# Patient Record
Sex: FEMALE | Race: WHITE | NOT HISPANIC OR LATINO | URBAN - METROPOLITAN AREA
[De-identification: names, ages, dates, MRNs, and addresses within clinical notes are randomized per-mention and may not be internally consistent; named-entity substitution may affect disease eponyms.]

---
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
## 2019-01-06 ENCOUNTER — APPOINTMENT (OUTPATIENT)
Dept: CT IMAGING | Facility: HOSPITAL | Age: 19
End: 2019-01-06

## 2019-01-06 ENCOUNTER — HOSPITAL ENCOUNTER (EMERGENCY)
Facility: HOSPITAL | Age: 19
Discharge: HOME | End: 2019-01-06
Attending: EMERGENCY MEDICINE | Admitting: EMERGENCY MEDICINE

## 2019-01-06 VITALS
DIASTOLIC BLOOD PRESSURE: 74 MMHG | SYSTOLIC BLOOD PRESSURE: 119 MMHG | HEART RATE: 91 BPM | TEMPERATURE: 98.6 F | OXYGEN SATURATION: 98 % | HEIGHT: 62 IN | RESPIRATION RATE: 17 BRPM | BODY MASS INDEX: 22.08 KG/M2 | WEIGHT: 120 LBS

## 2019-01-06 DIAGNOSIS — R10.31 RLQ ABDOMINAL PAIN: Primary | ICD-10-CM

## 2019-01-06 DIAGNOSIS — N39.0 URINARY TRACT INFECTION WITHOUT HEMATURIA, SITE UNSPECIFIED: ICD-10-CM

## 2019-01-06 LAB
ALBUMIN SERPL-MCNC: 4.9 G/DL (ref 3.2–4.8)
ALBUMIN/GLOB SERPL: 1.7 G/DL (ref 1.5–2.5)
ALP SERPL-CCNC: 84 U/L (ref 25–100)
ALT SERPL W P-5'-P-CCNC: 20 U/L (ref 7–40)
ANION GAP SERPL CALCULATED.3IONS-SCNC: 8 MMOL/L (ref 3–11)
AST SERPL-CCNC: 24 U/L (ref 0–33)
B-HCG UR QL: NEGATIVE
BACTERIA UR QL AUTO: ABNORMAL /HPF
BASOPHILS # BLD AUTO: 0.03 10*3/MM3 (ref 0–0.2)
BASOPHILS NFR BLD AUTO: 0.3 % (ref 0–1)
BILIRUB SERPL-MCNC: 0.4 MG/DL (ref 0.3–1.2)
BILIRUB UR QL STRIP: NEGATIVE
BUN BLD-MCNC: 11 MG/DL (ref 9–23)
BUN/CREAT SERPL: 17.2 (ref 7–25)
CALCIUM SPEC-SCNC: 9.5 MG/DL (ref 8.7–10.4)
CHLORIDE SERPL-SCNC: 104 MMOL/L (ref 99–109)
CLARITY UR: ABNORMAL
CO2 SERPL-SCNC: 29 MMOL/L (ref 20–31)
COLOR UR: YELLOW
CREAT BLD-MCNC: 0.64 MG/DL (ref 0.6–1.3)
DEPRECATED RDW RBC AUTO: 44.3 FL (ref 37–54)
EOSINOPHIL # BLD AUTO: 0.03 10*3/MM3 (ref 0–0.3)
EOSINOPHIL NFR BLD AUTO: 0.3 % (ref 0–3)
ERYTHROCYTE [DISTWIDTH] IN BLOOD BY AUTOMATED COUNT: 13.6 % (ref 11.3–14.5)
GFR SERPL CREATININE-BSD FRML MDRD: 120 ML/MIN/1.73
GLOBULIN UR ELPH-MCNC: 2.9 GM/DL
GLUCOSE BLD-MCNC: 91 MG/DL (ref 70–100)
GLUCOSE UR STRIP-MCNC: NEGATIVE MG/DL
HCT VFR BLD AUTO: 45.7 % (ref 34.5–44)
HGB BLD-MCNC: 14.7 G/DL (ref 11.5–15.5)
HGB UR QL STRIP.AUTO: NEGATIVE
HOLD SPECIMEN: NORMAL
HOLD SPECIMEN: NORMAL
HYALINE CASTS UR QL AUTO: ABNORMAL /LPF
IMM GRANULOCYTES # BLD AUTO: 0.01 10*3/MM3 (ref 0–0.03)
IMM GRANULOCYTES NFR BLD AUTO: 0.1 % (ref 0–0.6)
INTERNAL NEGATIVE CONTROL: NEGATIVE
INTERNAL POSITIVE CONTROL: POSITIVE
KETONES UR QL STRIP: NEGATIVE
LEUKOCYTE ESTERASE UR QL STRIP.AUTO: ABNORMAL
LIPASE SERPL-CCNC: 32 U/L (ref 6–51)
LYMPHOCYTES # BLD AUTO: 2.46 10*3/MM3 (ref 0.6–4.8)
LYMPHOCYTES NFR BLD AUTO: 26.1 % (ref 24–44)
Lab: NORMAL
MCH RBC QN AUTO: 28.7 PG (ref 27–31)
MCHC RBC AUTO-ENTMCNC: 32.2 G/DL (ref 32–36)
MCV RBC AUTO: 89.3 FL (ref 80–99)
MONOCYTES # BLD AUTO: 0.78 10*3/MM3 (ref 0–1)
MONOCYTES NFR BLD AUTO: 8.3 % (ref 0–12)
NEUTROPHILS # BLD AUTO: 6.14 10*3/MM3 (ref 1.5–8.3)
NEUTROPHILS NFR BLD AUTO: 65 % (ref 41–71)
NITRITE UR QL STRIP: POSITIVE
PH UR STRIP.AUTO: >=9 [PH] (ref 5–8)
PLATELET # BLD AUTO: 298 10*3/MM3 (ref 150–450)
PMV BLD AUTO: 10.3 FL (ref 6–12)
POTASSIUM BLD-SCNC: 4.4 MMOL/L (ref 3.5–5.5)
PROT SERPL-MCNC: 7.8 G/DL (ref 5.7–8.2)
PROT UR QL STRIP: ABNORMAL
RBC # BLD AUTO: 5.12 10*6/MM3 (ref 3.89–5.14)
RBC # UR: ABNORMAL /HPF
REF LAB TEST METHOD: ABNORMAL
SODIUM BLD-SCNC: 141 MMOL/L (ref 132–146)
SP GR UR STRIP: 1.02 (ref 1–1.03)
SQUAMOUS #/AREA URNS HPF: ABNORMAL /HPF
UROBILINOGEN UR QL STRIP: ABNORMAL
WBC NRBC COR # BLD: 9.44 10*3/MM3 (ref 4.5–13.5)
WBC UR QL AUTO: ABNORMAL /HPF
WHOLE BLOOD HOLD SPECIMEN: NORMAL
WHOLE BLOOD HOLD SPECIMEN: NORMAL

## 2019-01-06 PROCEDURE — 25010000002 CEFTRIAXONE PER 250 MG: Performed by: PHYSICIAN ASSISTANT

## 2019-01-06 PROCEDURE — 99284 EMERGENCY DEPT VISIT MOD MDM: CPT

## 2019-01-06 PROCEDURE — 83690 ASSAY OF LIPASE: CPT | Performed by: EMERGENCY MEDICINE

## 2019-01-06 PROCEDURE — 81025 URINE PREGNANCY TEST: CPT | Performed by: EMERGENCY MEDICINE

## 2019-01-06 PROCEDURE — 80053 COMPREHEN METABOLIC PANEL: CPT | Performed by: EMERGENCY MEDICINE

## 2019-01-06 PROCEDURE — 87077 CULTURE AEROBIC IDENTIFY: CPT | Performed by: EMERGENCY MEDICINE

## 2019-01-06 PROCEDURE — 85025 COMPLETE CBC W/AUTO DIFF WBC: CPT | Performed by: EMERGENCY MEDICINE

## 2019-01-06 PROCEDURE — 87186 SC STD MICRODIL/AGAR DIL: CPT | Performed by: EMERGENCY MEDICINE

## 2019-01-06 PROCEDURE — 96365 THER/PROPH/DIAG IV INF INIT: CPT

## 2019-01-06 PROCEDURE — 25010000002 IOPAMIDOL 61 % SOLUTION: Performed by: EMERGENCY MEDICINE

## 2019-01-06 PROCEDURE — 81001 URINALYSIS AUTO W/SCOPE: CPT | Performed by: EMERGENCY MEDICINE

## 2019-01-06 PROCEDURE — 96375 TX/PRO/DX INJ NEW DRUG ADDON: CPT

## 2019-01-06 PROCEDURE — 25010000002 ONDANSETRON PER 1 MG: Performed by: PHYSICIAN ASSISTANT

## 2019-01-06 PROCEDURE — 74177 CT ABD & PELVIS W/CONTRAST: CPT

## 2019-01-06 PROCEDURE — 87086 URINE CULTURE/COLONY COUNT: CPT | Performed by: EMERGENCY MEDICINE

## 2019-01-06 RX ORDER — CEFDINIR 300 MG/1
300 CAPSULE ORAL 2 TIMES DAILY
Qty: 20 CAPSULE | Refills: 0 | OUTPATIENT
Start: 2019-01-06 | End: 2019-07-19

## 2019-01-06 RX ORDER — CEFTRIAXONE SODIUM 1 G/50ML
1 INJECTION, SOLUTION INTRAVENOUS ONCE
Status: COMPLETED | OUTPATIENT
Start: 2019-01-06 | End: 2019-01-06

## 2019-01-06 RX ORDER — ONDANSETRON 4 MG/1
4 TABLET, ORALLY DISINTEGRATING ORAL EVERY 6 HOURS PRN
Qty: 15 TABLET | Refills: 0 | OUTPATIENT
Start: 2019-01-06 | End: 2019-07-19 | Stop reason: HOSPADM

## 2019-01-06 RX ORDER — SODIUM CHLORIDE 0.9 % (FLUSH) 0.9 %
10 SYRINGE (ML) INJECTION AS NEEDED
Status: DISCONTINUED | OUTPATIENT
Start: 2019-01-06 | End: 2019-01-06 | Stop reason: HOSPADM

## 2019-01-06 RX ORDER — ONDANSETRON 2 MG/ML
4 INJECTION INTRAMUSCULAR; INTRAVENOUS ONCE
Status: COMPLETED | OUTPATIENT
Start: 2019-01-06 | End: 2019-01-06

## 2019-01-06 RX ADMIN — ONDANSETRON 4 MG: 2 INJECTION INTRAMUSCULAR; INTRAVENOUS at 14:45

## 2019-01-06 RX ADMIN — IOPAMIDOL 85 ML: 612 INJECTION, SOLUTION INTRAVENOUS at 17:27

## 2019-01-06 RX ADMIN — SODIUM CHLORIDE 1000 ML: 9 INJECTION, SOLUTION INTRAVENOUS at 14:45

## 2019-01-06 RX ADMIN — CEFTRIAXONE SODIUM 1 G: 1 INJECTION, SOLUTION INTRAVENOUS at 15:59

## 2019-01-06 NOTE — ED PROVIDER NOTES
Subjective   18-year-old female presents to the emergency department with nausea and vomiting.  The symptoms began about 10:00 this morning.  She states that she has vomited at least 10 times.  No diarrhea.  She complains of some mild suprapubic abdominal pain secondary to the vomiting.  No fever or chills.  Normal urination.  Her last menstrual period was 3 weeks ago.  No known health issues.  She is a nonsmoker.  No alcohol or drug use.  Her PCP is Vishnu Francisco.            Review of Systems   Constitutional: Negative for chills and fever.   HENT: Negative for sore throat.    Eyes: Negative for visual disturbance.   Respiratory: Negative for cough and shortness of breath.    Cardiovascular: Negative for chest pain.   Gastrointestinal: Positive for abdominal pain (mild suprapubic), nausea and vomiting. Negative for blood in stool and diarrhea.   Endocrine: Negative for polydipsia, polyphagia and polyuria.   Genitourinary: Negative for decreased urine volume and dysuria.   Musculoskeletal: Positive for back pain (mild low back pain). Negative for neck pain.   Skin: Negative for rash.   Allergic/Immunologic: Negative for immunocompromised state.   Neurological: Negative for dizziness, weakness, light-headedness and headaches.   Hematological: Negative for adenopathy.   Psychiatric/Behavioral: Negative.        Past Medical History:   Diagnosis Date   • Migraine        No Known Allergies    Past Surgical History:   Procedure Laterality Date   • ADENOIDECTOMY     • EAR TUBES Bilateral    • INNER EAR SURGERY Right        Family History   Problem Relation Age of Onset   • Alcohol abuse Father    • Hypertension Maternal Grandmother    • Cancer Maternal Grandfather    • Parkinsonism Paternal Grandfather        Social History     Socioeconomic History   • Marital status: Single     Spouse name: Not on file   • Number of children: Not on file   • Years of education: Not on file   • Highest education level: Not on file    Occupational History   • Occupation: student   Tobacco Use   • Smoking status: Never Smoker   • Smokeless tobacco: Never Used   Substance and Sexual Activity   • Alcohol use: No   • Drug use: No   • Sexual activity: Defer     Birth control/protection: Injection   Social History Narrative    Mother and grandmother with pt today           Objective   Physical Exam   Constitutional: She appears well-developed and well-nourished.   HENT:   Head: Normocephalic.   Nose: Nose normal.   Mouth/Throat: Oropharynx is clear and moist.   Eyes: Conjunctivae are normal. Pupils are equal, round, and reactive to light. No scleral icterus.   Neck: Normal range of motion. Neck supple.   Cardiovascular: Normal rate, regular rhythm, normal heart sounds and intact distal pulses.   Pulmonary/Chest: Effort normal and breath sounds normal. No respiratory distress.   Abdominal: Soft. Bowel sounds are normal. She exhibits no mass. There is tenderness (mild suprpubic tenderness). There is no guarding.   Musculoskeletal: Normal range of motion. She exhibits no edema or tenderness.   Neurological: She is alert.   Skin: Skin is warm and dry.   Psychiatric: She has a normal mood and affect.       Procedures           ED Course      CT of the abdomen and pelvis is negative.   Urinalysis shows 4+ bacteria, 3-5 white cells and 7-12 epithelial cells.  White count is normal at 9.44.  I will send her urine for culture and treat her for UTI.  She was given Rocephin in the ER.  I will discharge her on Omnicef and Zofran.  She has been advised return to the emergency department if she has worsening symptoms.            MDM      Final diagnoses:   RLQ abdominal pain   Urinary tract infection without hematuria, site unspecified            Chay Balbuena, PA  01/06/19 3384

## 2019-01-07 NOTE — DISCHARGE INSTRUCTIONS
Rest.  Zofran and Omnicef as prescribed.  Follow up with your PCP at next available appointment.  Return to ER for recheck tomorrow if symptoms persist or worsen.

## 2019-01-08 LAB — BACTERIA SPEC AEROBE CULT: ABNORMAL

## 2019-07-19 ENCOUNTER — APPOINTMENT (OUTPATIENT)
Dept: GENERAL RADIOLOGY | Facility: HOSPITAL | Age: 19
End: 2019-07-19

## 2019-07-19 ENCOUNTER — HOSPITAL ENCOUNTER (EMERGENCY)
Facility: HOSPITAL | Age: 19
Discharge: HOME | End: 2019-07-19
Attending: EMERGENCY MEDICINE | Admitting: EMERGENCY MEDICINE

## 2019-07-19 VITALS
HEIGHT: 62 IN | WEIGHT: 120 LBS | OXYGEN SATURATION: 100 % | HEART RATE: 87 BPM | RESPIRATION RATE: 16 BRPM | SYSTOLIC BLOOD PRESSURE: 125 MMHG | DIASTOLIC BLOOD PRESSURE: 74 MMHG | TEMPERATURE: 98.4 F | BODY MASS INDEX: 22.08 KG/M2

## 2019-07-19 DIAGNOSIS — N39.0 URINARY TRACT INFECTION IN FEMALE: ICD-10-CM

## 2019-07-19 DIAGNOSIS — J40 BRONCHITIS: Primary | ICD-10-CM

## 2019-07-19 LAB
ALBUMIN SERPL-MCNC: 4.3 G/DL (ref 3.5–5.2)
ALBUMIN/GLOB SERPL: 1.4 G/DL
ALP SERPL-CCNC: 77 U/L (ref 39–117)
ALT SERPL W P-5'-P-CCNC: 12 U/L (ref 1–33)
ANION GAP SERPL CALCULATED.3IONS-SCNC: 10 MMOL/L (ref 5–15)
AST SERPL-CCNC: 20 U/L (ref 1–32)
B-HCG UR QL: NEGATIVE
BACTERIA UR QL AUTO: ABNORMAL /HPF
BASOPHILS # BLD AUTO: 0.03 10*3/MM3 (ref 0–0.2)
BASOPHILS NFR BLD AUTO: 0.5 % (ref 0–1.5)
BILIRUB SERPL-MCNC: 0.5 MG/DL (ref 0.2–1.2)
BILIRUB UR QL STRIP: NEGATIVE
BUN BLD-MCNC: 8 MG/DL (ref 6–20)
BUN/CREAT SERPL: 11.1 (ref 7–25)
CALCIUM SPEC-SCNC: 9.1 MG/DL (ref 8.6–10.5)
CHLORIDE SERPL-SCNC: 103 MMOL/L (ref 98–107)
CLARITY UR: ABNORMAL
CO2 SERPL-SCNC: 25 MMOL/L (ref 22–29)
COLOR UR: YELLOW
CREAT BLD-MCNC: 0.72 MG/DL (ref 0.57–1)
D DIMER PPP FEU-MCNC: <0.27 MCGFEU/ML (ref 0–0.56)
DEPRECATED RDW RBC AUTO: 40.5 FL (ref 37–54)
EOSINOPHIL # BLD AUTO: 0.06 10*3/MM3 (ref 0–0.4)
EOSINOPHIL NFR BLD AUTO: 0.9 % (ref 0.3–6.2)
ERYTHROCYTE [DISTWIDTH] IN BLOOD BY AUTOMATED COUNT: 12.3 % (ref 12.3–15.4)
GFR SERPL CREATININE-BSD FRML MDRD: 104 ML/MIN/1.73
GLOBULIN UR ELPH-MCNC: 3 GM/DL
GLUCOSE BLD-MCNC: 90 MG/DL (ref 65–99)
GLUCOSE UR STRIP-MCNC: NEGATIVE MG/DL
HCT VFR BLD AUTO: 43 % (ref 34–46.6)
HGB BLD-MCNC: 13.9 G/DL (ref 12–15.9)
HGB UR QL STRIP.AUTO: NEGATIVE
HYALINE CASTS UR QL AUTO: ABNORMAL /LPF
IMM GRANULOCYTES # BLD AUTO: 0.01 10*3/MM3 (ref 0–0.05)
IMM GRANULOCYTES NFR BLD AUTO: 0.2 % (ref 0–0.5)
INTERNAL NEGATIVE CONTROL: NEGATIVE
INTERNAL POSITIVE CONTROL: POSITIVE
KETONES UR QL STRIP: ABNORMAL
LEUKOCYTE ESTERASE UR QL STRIP.AUTO: ABNORMAL
LYMPHOCYTES # BLD AUTO: 3.33 10*3/MM3 (ref 0.7–3.1)
LYMPHOCYTES NFR BLD AUTO: 51.5 % (ref 19.6–45.3)
Lab: NORMAL
MCH RBC QN AUTO: 29.3 PG (ref 26.6–33)
MCHC RBC AUTO-ENTMCNC: 32.3 G/DL (ref 31.5–35.7)
MCV RBC AUTO: 90.7 FL (ref 79–97)
MONOCYTES # BLD AUTO: 0.65 10*3/MM3 (ref 0.1–0.9)
MONOCYTES NFR BLD AUTO: 10 % (ref 5–12)
NEUTROPHILS # BLD AUTO: 2.39 10*3/MM3 (ref 1.7–7)
NEUTROPHILS NFR BLD AUTO: 36.9 % (ref 42.7–76)
NITRITE UR QL STRIP: NEGATIVE
NRBC BLD AUTO-RTO: 0 /100 WBC (ref 0–0.2)
PH UR STRIP.AUTO: 7 [PH] (ref 5–8)
PLATELET # BLD AUTO: 240 10*3/MM3 (ref 140–450)
PMV BLD AUTO: 10.5 FL (ref 6–12)
POTASSIUM BLD-SCNC: 4 MMOL/L (ref 3.5–5.2)
PROT SERPL-MCNC: 7.3 G/DL (ref 6–8.5)
PROT UR QL STRIP: ABNORMAL
RBC # BLD AUTO: 4.74 10*6/MM3 (ref 3.77–5.28)
RBC # UR: ABNORMAL /HPF
REF LAB TEST METHOD: ABNORMAL
SODIUM BLD-SCNC: 138 MMOL/L (ref 136–145)
SP GR UR STRIP: 1.02 (ref 1–1.03)
SQUAMOUS #/AREA URNS HPF: ABNORMAL /HPF
UROBILINOGEN UR QL STRIP: ABNORMAL
WBC NRBC COR # BLD: 6.47 10*3/MM3 (ref 3.4–10.8)
WBC UR QL AUTO: ABNORMAL /HPF

## 2019-07-19 PROCEDURE — 71046 X-RAY EXAM CHEST 2 VIEWS: CPT

## 2019-07-19 PROCEDURE — 80053 COMPREHEN METABOLIC PANEL: CPT | Performed by: NURSE PRACTITIONER

## 2019-07-19 PROCEDURE — 81025 URINE PREGNANCY TEST: CPT | Performed by: NURSE PRACTITIONER

## 2019-07-19 PROCEDURE — 94640 AIRWAY INHALATION TREATMENT: CPT

## 2019-07-19 PROCEDURE — 85025 COMPLETE CBC W/AUTO DIFF WBC: CPT | Performed by: NURSE PRACTITIONER

## 2019-07-19 PROCEDURE — 85379 FIBRIN DEGRADATION QUANT: CPT | Performed by: NURSE PRACTITIONER

## 2019-07-19 PROCEDURE — 99284 EMERGENCY DEPT VISIT MOD MDM: CPT

## 2019-07-19 PROCEDURE — 81001 URINALYSIS AUTO W/SCOPE: CPT | Performed by: NURSE PRACTITIONER

## 2019-07-19 RX ORDER — BENZONATATE 100 MG/1
100 CAPSULE ORAL 3 TIMES DAILY PRN
Qty: 21 CAPSULE | Refills: 0 | Status: SHIPPED | OUTPATIENT
Start: 2019-07-19 | End: 2019-09-26

## 2019-07-19 RX ORDER — CETIRIZINE HYDROCHLORIDE 10 MG/1
10 TABLET ORAL DAILY
Qty: 30 TABLET | Refills: 0 | Status: SHIPPED | OUTPATIENT
Start: 2019-07-19 | End: 2019-08-09

## 2019-07-19 RX ORDER — SODIUM CHLORIDE 0.9 % (FLUSH) 0.9 %
10 SYRINGE (ML) INJECTION AS NEEDED
Status: DISCONTINUED | OUTPATIENT
Start: 2019-07-19 | End: 2019-07-19 | Stop reason: HOSPADM

## 2019-07-19 RX ORDER — ALBUTEROL SULFATE 90 UG/1
2 AEROSOL, METERED RESPIRATORY (INHALATION) EVERY 6 HOURS PRN
Qty: 8 G | Refills: 0 | Status: SHIPPED | OUTPATIENT
Start: 2019-07-19 | End: 2020-08-21

## 2019-07-19 RX ORDER — IPRATROPIUM BROMIDE AND ALBUTEROL SULFATE 2.5; .5 MG/3ML; MG/3ML
3 SOLUTION RESPIRATORY (INHALATION) ONCE
Status: COMPLETED | OUTPATIENT
Start: 2019-07-19 | End: 2019-07-19

## 2019-07-19 RX ORDER — CEFDINIR 300 MG/1
300 CAPSULE ORAL 2 TIMES DAILY
Qty: 20 CAPSULE | Refills: 0 | Status: SHIPPED | OUTPATIENT
Start: 2019-07-19 | End: 2019-08-09

## 2019-07-19 RX ORDER — METHYLPREDNISOLONE 4 MG/1
TABLET ORAL
Qty: 21 TABLET | Refills: 0 | Status: SHIPPED | OUTPATIENT
Start: 2019-07-19 | End: 2019-08-09

## 2019-07-19 RX ADMIN — IPRATROPIUM BROMIDE AND ALBUTEROL SULFATE 3 ML: 2.5; .5 SOLUTION RESPIRATORY (INHALATION) at 11:45

## 2019-08-09 ENCOUNTER — OFFICE VISIT (OUTPATIENT)
Dept: INTERNAL MEDICINE | Facility: CLINIC | Age: 19
End: 2019-08-09

## 2019-08-09 VITALS
HEART RATE: 71 BPM | TEMPERATURE: 97.3 F | BODY MASS INDEX: 21.74 KG/M2 | SYSTOLIC BLOOD PRESSURE: 116 MMHG | HEIGHT: 62 IN | DIASTOLIC BLOOD PRESSURE: 70 MMHG | OXYGEN SATURATION: 98 % | WEIGHT: 118.13 LBS | RESPIRATION RATE: 18 BRPM

## 2019-08-09 DIAGNOSIS — R05.9 COUGHING: ICD-10-CM

## 2019-08-09 DIAGNOSIS — J30.9 ALLERGIC RHINITIS, UNSPECIFIED SEASONALITY, UNSPECIFIED TRIGGER: Primary | ICD-10-CM

## 2019-08-09 DIAGNOSIS — Z13.89 ENCOUNTER FOR SCREENING FOR OTHER DISORDER: ICD-10-CM

## 2019-08-09 DIAGNOSIS — Z00.00 ENCOUNTER FOR PREVENTIVE HEALTH EXAMINATION: ICD-10-CM

## 2019-08-09 PROBLEM — R42 DIZZINESS: Status: RESOLVED | Noted: 2018-04-25 | Resolved: 2019-08-09

## 2019-08-09 PROBLEM — R20.2 PARESTHESIA: Status: RESOLVED | Noted: 2018-04-25 | Resolved: 2019-08-09

## 2019-08-09 PROCEDURE — 99395 PREV VISIT EST AGE 18-39: CPT | Performed by: NURSE PRACTITIONER

## 2019-08-09 RX ORDER — ALBUTEROL SULFATE 2.5 MG/3ML
2.5 SOLUTION RESPIRATORY (INHALATION) EVERY 4 HOURS PRN
Qty: 50 VIAL | Refills: 12 | Status: SHIPPED | OUTPATIENT
Start: 2019-08-09 | End: 2020-08-21

## 2019-08-09 RX ORDER — MONTELUKAST SODIUM 10 MG/1
10 TABLET ORAL NIGHTLY
Qty: 30 TABLET | Refills: 3 | Status: SHIPPED | OUTPATIENT
Start: 2019-08-09 | End: 2019-09-26

## 2019-08-09 RX ORDER — CETIRIZINE HYDROCHLORIDE 10 MG/1
10 TABLET ORAL DAILY
Qty: 30 TABLET | Refills: 6 | Status: SHIPPED | OUTPATIENT
Start: 2019-08-09 | End: 2019-09-26

## 2019-08-09 NOTE — PROGRESS NOTES
Chief Complaint   Patient presents with   • Annual Exam     fasting   • Cough     and sneezing       Subjective   Physical    Shortness of Breath   This is a recurrent problem. The current episode started more than 1 month ago. The problem occurs daily. The problem has been gradually worsening. The average episode lasts 2 hours. Associated symptoms include chest pain, coryza, neck pain, orthopnea, PND, rhinorrhea and sputum production. Pertinent negatives include no abdominal pain, claudication, ear pain, fever, hemoptysis, leg pain, leg swelling, rash, sore throat, swollen glands, syncope, vomiting or wheezing. Nothing aggravates the symptoms.      Physical     She had postpartum preeclampsia and elevated blood pressure resolved. No consistent elevation with blood pressure.     Patient complains of cough x 2 months. Cough is productive and she produces copious white secretions. No fever. Associated shortness of breath and chest pain, nasal congestion and intermittent runny nose and sneezing. She has visited urgent care  (6/13/2019) and emergency room 7/19/2019. She has had a negative chest x ray. She has taken azithromycin, prednisone, cefdinir, Zyrtec, Tessalon and albuterol without relief.  She denies any history of allergies or asthma. No nausea, vomiting, chills, body aches, ear pain or discharge. No eye discharge, redness or itching. No recent travel. No known TB risks. She commutes from [place] to work in [place] and stays intermittently at a [employer] apartment. She reports [facility] apartment was positive for mold with airborne test. She is in process of getting out of lease. She is using albuterol as needed with limited relief. She works as a , but states all natural products are used and no exposure to chemicals or fumes.   Oksana Aguirre is a 18 y.o. female.     Are you currently seeing any other doctors or specialists?  None  Are you currently taking any OTC medications or herbal  medications?   None  Sleep: 8 hours  Diet: balanced  Exercise: yes    Most recent colonoscopy: N/A  Most recent mammogram: N/A  Most recent pap smear: current 2019 [facility]  First day of last menses: 8/8/2019    Regular dental visits: current  Regular eye exams: current    The following portions of the patient's history were reviewed and updated as appropriate: allergies, current medications, past family history, past medical history, past social history, past surgical history and problem list.    No Known Allergies  Social History     Tobacco Use   • Smoking status: Never Smoker   • Smokeless tobacco: Never Used   Substance Use Topics   • Alcohol use: No     Past Surgical History:   Procedure Laterality Date   • ADENOIDECTOMY     • EAR TUBES Bilateral    • INNER EAR SURGERY Right      Family History   Problem Relation Age of Onset   • Alcohol abuse Father    • Hypertension Maternal Grandmother    • Anemia Maternal Grandmother    • Cancer Maternal Grandmother    • Cancer Maternal Grandfather    • Parkinsonism Paternal Grandfather          Current Outpatient Medications:   •  albuterol sulfate  (90 Base) MCG/ACT inhaler, Inhale 2 puffs Every 6 (Six) Hours As Needed for Wheezing., Disp: 8 g, Rfl: 0  •  benzonatate (TESSALON) 100 MG capsule, Take 1 capsule by mouth 3 (Three) Times a Day As Needed for Cough., Disp: 21 capsule, Rfl: 0  •  Etonogestrel (NEXPLANON) 68 MG implant subdermal implant, Inject 1 each into the skin 1 (One) Time., Disp: , Rfl:   •  albuterol (PROVENTIL) (2.5 MG/3ML) 0.083% nebulizer solution, Take 2.5 mg by nebulization Every 4 (Four) Hours As Needed for Wheezing., Disp: 50 vial, Rfl: 12  •  cetirizine (zyrTEC) 10 MG tablet, Take 1 tablet by mouth Daily., Disp: 30 tablet, Rfl: 6  •  montelukast (SINGULAIR) 10 MG tablet, Take 1 tablet by mouth Every Night., Disp: 30 tablet, Rfl: 3    Patient Active Problem List   Diagnosis   • White matter abnormality on MRI of brain   • Periodic  headache syndrome, not intractable   • Coughing   • Allergic rhinitis       Review of Systems   Constitutional: Negative for fatigue, fever, unexpected weight gain and unexpected weight loss.   HENT: Positive for congestion, rhinorrhea and sneezing. Negative for ear pain, nosebleeds, sinus pressure, sore throat, tinnitus, trouble swallowing and voice change.    Respiratory: Positive for sputum production and shortness of breath. Negative for hemoptysis and wheezing.    Cardiovascular: Positive for chest pain, orthopnea and PND. Negative for claudication, leg swelling and syncope.   Gastrointestinal: Negative for abdominal pain and vomiting.   Genitourinary:        Recurring UTI-no current symptoms   Musculoskeletal: Positive for neck pain.   Skin: Negative for rash.   Neurological: Negative.    Hematological: Negative.    Psychiatric/Behavioral: Negative.        Objective   Vitals:    08/09/19 0829   BP: 116/70   Pulse: 71   Resp: 18   Temp: 97.3 °F (36.3 °C)   SpO2: 98%     Physical Exam   Constitutional: She is oriented to person, place, and time. She appears well-developed and well-nourished. She is active and cooperative. She is easily aroused.  Non-toxic appearance. She does not have a sickly appearance. She does not appear ill. No distress. She is not overweight.She is not obese.She is not morbidly obese.  HENT:   Head: Normocephalic and atraumatic. Head is without abrasion. Hair is normal.   Right Ear: Hearing, tympanic membrane, external ear and ear canal normal. No foreign bodies. Tympanic membrane is not perforated and not erythematous.   Left Ear: Hearing, tympanic membrane, external ear and ear canal normal. No foreign bodies. Tympanic membrane is not perforated and not erythematous.   Nose: Mucosal edema, rhinorrhea and congestion present. No septal deviation. No epistaxis.  No foreign bodies.   Mouth/Throat: Uvula is midline and oropharynx is clear and moist. No oral lesions. Normal dentition.    Scarring bilateral TM   Eyes: Conjunctivae and lids are normal. Pupils are equal, round, and reactive to light. Right eye exhibits no discharge. Left eye exhibits no discharge. Right conjunctiva is not injected. Left conjunctiva is not injected. No scleral icterus.   Neck: Normal range of motion and full passive range of motion without pain. Neck supple. No edema and normal range of motion present. No thyroid mass and no thyromegaly present.   Cardiovascular: Normal rate, regular rhythm and normal heart sounds. Exam reveals no gallop and no friction rub.   No murmur heard.  Pulmonary/Chest: Effort normal and breath sounds normal. No accessory muscle usage. She has no rhonchi. She has no rales. She exhibits no tenderness.   Abdominal: Soft. Bowel sounds are normal. She exhibits no distension. There is no hepatosplenomegaly or hepatomegaly. There is no tenderness. There is no CVA tenderness.   Musculoskeletal: Normal range of motion. She exhibits no edema, tenderness or deformity.   Lymphadenopathy:     She has no cervical adenopathy.   Neurological: She is alert, oriented to person, place, and time and easily aroused. She has normal reflexes. No cranial nerve deficit. Coordination normal.   Muscle strength 5/5 and equal throughout.   Skin: Skin is warm, dry and intact. No abrasion and no rash noted. She is not diaphoretic. No cyanosis or erythema. Nails show no clubbing.   Psychiatric: She has a normal mood and affect. Her speech is normal and behavior is normal.   Nursing note and vitals reviewed.        Results for orders placed or performed during the hospital encounter of 07/19/19   Comprehensive Metabolic Panel   Result Value Ref Range    Glucose 90 65 - 99 mg/dL    BUN 8 6 - 20 mg/dL    Creatinine 0.72 0.57 - 1.00 mg/dL    Sodium 138 136 - 145 mmol/L    Potassium 4.0 3.5 - 5.2 mmol/L    Chloride 103 98 - 107 mmol/L    CO2 25.0 22.0 - 29.0 mmol/L    Calcium 9.1 8.6 - 10.5 mg/dL    Total Protein 7.3 6.0 - 8.5  g/dL    Albumin 4.30 3.50 - 5.20 g/dL    ALT (SGPT) 12 1 - 33 U/L    AST (SGOT) 20 1 - 32 U/L    Alkaline Phosphatase 77 39 - 117 U/L    Total Bilirubin 0.5 0.2 - 1.2 mg/dL    eGFR Non African Amer 104 >60 mL/min/1.73    Globulin 3.0 gm/dL    A/G Ratio 1.4 g/dL    BUN/Creatinine Ratio 11.1 7.0 - 25.0    Anion Gap 10.0 5.0 - 15.0 mmol/L   D-dimer, Quantitative   Result Value Ref Range    D-Dimer, Quantitative <0.27 0.00 - 0.56 MCGFEU/mL   CBC Auto Differential   Result Value Ref Range    WBC 6.47 3.40 - 10.80 10*3/mm3    RBC 4.74 3.77 - 5.28 10*6/mm3    Hemoglobin 13.9 12.0 - 15.9 g/dL    Hematocrit 43.0 34.0 - 46.6 %    MCV 90.7 79.0 - 97.0 fL    MCH 29.3 26.6 - 33.0 pg    MCHC 32.3 31.5 - 35.7 g/dL    RDW 12.3 12.3 - 15.4 %    RDW-SD 40.5 37.0 - 54.0 fl    MPV 10.5 6.0 - 12.0 fL    Platelets 240 140 - 450 10*3/mm3    Neutrophil % 36.9 (L) 42.7 - 76.0 %    Lymphocyte % 51.5 (H) 19.6 - 45.3 %    Monocyte % 10.0 5.0 - 12.0 %    Eosinophil % 0.9 0.3 - 6.2 %    Basophil % 0.5 0.0 - 1.5 %    Immature Grans % 0.2 0.0 - 0.5 %    Neutrophils, Absolute 2.39 1.70 - 7.00 10*3/mm3    Lymphocytes, Absolute 3.33 (H) 0.70 - 3.10 10*3/mm3    Monocytes, Absolute 0.65 0.10 - 0.90 10*3/mm3    Eosinophils, Absolute 0.06 0.00 - 0.40 10*3/mm3    Basophils, Absolute 0.03 0.00 - 0.20 10*3/mm3    Immature Grans, Absolute 0.01 0.00 - 0.05 10*3/mm3    nRBC 0.0 0.0 - 0.2 /100 WBC   Urinalysis With Microscopic If Indicated (No Culture) - Urine, Clean Catch   Result Value Ref Range    Color, UA Yellow Yellow, Straw    Appearance, UA Cloudy (A) Clear    pH, UA 7.0 5.0 - 8.0    Specific Gravity, UA 1.024 1.001 - 1.030    Glucose, UA Negative Negative    Ketones, UA Trace (A) Negative    Bilirubin, UA Negative Negative    Blood, UA Negative Negative    Protein, UA Trace (A) Negative    Leuk Esterase, UA Large (3+) (A) Negative    Nitrite, UA Negative Negative    Urobilinogen, UA 1.0 E.U./dL 0.2 - 1.0 E.U./dL   Urinalysis, Microscopic Only - Urine,  Clean Catch   Result Value Ref Range    RBC, UA 0-2 None Seen, 0-2 /HPF    WBC, UA Too Numerous to Count (A) None Seen, 0-2 /HPF    Bacteria, UA 1+ (A) None Seen, Trace /HPF    Squamous Epithelial Cells, UA 13-20 (A) None Seen, 0-2 /HPF    Hyaline Casts, UA 21-30 0 - 6 /LPF    Methodology Automated Microscopy    POCT Pregnancy, Urine   Result Value Ref Range    HCG, Urine, QL Negative Negative    Lot Number [id number]     Internal Positive Control Positive     Internal Negative Control Negative        Assessment/Plan   Oksana was seen today for annual exam and cough.    Diagnoses and all orders for this visit:    Allergic rhinitis, unspecified seasonality, unspecified trigger  -     montelukast (SINGULAIR) 10 MG tablet; Take 1 tablet by mouth Every Night.  -     cetirizine (zyrTEC) 10 MG tablet; Take 1 tablet by mouth Daily.  -     Ambulatory Referral to Allergy    Encounter for screening for other disorder  -     T4, Free  -     TSH  -     CBC & Differential  -     Comprehensive Metabolic Panel  -     Lipid Panel    Coughing  -     Home Nebulizer  -     Allergens, Zone 8  -     Ambulatory Referral to Allergy  -     albuterol (PROVENTIL) (2.5 MG/3ML) 0.083% nebulizer solution; Take 2.5 mg by nebulization Every 4 (Four) Hours As Needed for Wheezing.    Encounter for preventive health examination    Discussed need to avoid exposure to suspected mold.              Patient education discussed during this visit:  - avoidance of texting while driving and the need for wearing seatbelt  - use of sunscreen  - healthy sleep habits and appropriate amount of sleep  - H2O consumption, well-balanced diet  - exercise routine which includes at least 150 minutes of cardio per week + muscle strengthening exercises  - immunizations including annual flu vaccination      Return in about 2 months (around 10/9/2019), or if symptoms worsen or fail to improve, physical in 1 year.

## 2019-08-13 LAB
A ALTERNATA IGE QN: <0.1 KU/L
A FUMIGATUS IGE QN: <0.1 KU/L
ALBUMIN SERPL-MCNC: 4 G/DL (ref 3.5–5.2)
ALBUMIN/GLOB SERPL: 1.4 G/DL
ALP SERPL-CCNC: 69 U/L (ref 39–117)
ALT SERPL-CCNC: 10 U/L (ref 1–33)
AMER ROACH IGE QN: <0.1 KU/L
AST SERPL-CCNC: 16 U/L (ref 1–32)
BAHIA GRASS IGE QN: <0.1 KU/L
BASOPHILS # BLD AUTO: 0.03 10*3/MM3 (ref 0–0.2)
BASOPHILS NFR BLD AUTO: 0.7 % (ref 0–1.5)
BERMUDA GRASS IGE QN: <0.1 KU/L
BILIRUB SERPL-MCNC: 0.2 MG/DL (ref 0.2–1.2)
BOXELDER IGE QN: <0.1 KU/L
BUN SERPL-MCNC: 11 MG/DL (ref 6–20)
BUN/CREAT SERPL: 17.2 (ref 7–25)
C HERBARUM IGE QN: <0.1 KU/L
CALCIUM SERPL-MCNC: 8.5 MG/DL (ref 8.6–10.5)
CAT DANDER IGE QN: <0.1 KU/L
CHLORIDE SERPL-SCNC: 105 MMOL/L (ref 98–107)
CHOLEST SERPL-MCNC: 137 MG/DL (ref 0–200)
CMN PIGWEED IGE QN: <0.1 KU/L
CO2 SERPL-SCNC: 25.7 MMOL/L (ref 22–29)
COMMON RAGWEED IGE QN: <0.1 KU/L
CONV CLASS DESCRIPTION: NORMAL
CREAT SERPL-MCNC: 0.64 MG/DL (ref 0.57–1)
D FARINAE IGE QN: <0.1 KU/L
D PTERONYSS IGE QN: <0.1 KU/L
DOG DANDER IGE QN: <0.1 KU/L
ENGL PLANTAIN IGE QN: <0.1 KU/L
EOSINOPHIL # BLD AUTO: 0.08 10*3/MM3 (ref 0–0.4)
EOSINOPHIL NFR BLD AUTO: 1.9 % (ref 0.3–6.2)
ERYTHROCYTE [DISTWIDTH] IN BLOOD BY AUTOMATED COUNT: 13.1 % (ref 12.3–15.4)
GLOBULIN SER CALC-MCNC: 2.8 GM/DL
GLUCOSE SERPL-MCNC: 91 MG/DL (ref 65–99)
HAZELNUT POLN IGE QN: <0.1 KU/L
HCT VFR BLD AUTO: 39.9 % (ref 34–46.6)
HDLC SERPL-MCNC: 42 MG/DL (ref 40–60)
HGB BLD-MCNC: 12.4 G/DL (ref 12–15.9)
IMM GRANULOCYTES # BLD AUTO: 0.01 10*3/MM3 (ref 0–0.05)
IMM GRANULOCYTES NFR BLD AUTO: 0.2 % (ref 0–0.5)
JOHNSON GRASS IGE QN: <0.1 KU/L
KENT BLUE GRASS IGE QN: <0.1 KU/L
LDLC SERPL CALC-MCNC: 84 MG/DL (ref 0–100)
LONDON PLANE IGE QN: <0.1 KU/L
LYMPHOCYTES # BLD AUTO: 1.62 10*3/MM3 (ref 0.7–3.1)
LYMPHOCYTES NFR BLD AUTO: 38.1 % (ref 19.6–45.3)
M RACEMOSUS IGE QN: <0.1 KU/L
MCH RBC QN AUTO: 29.6 PG (ref 26.6–33)
MCHC RBC AUTO-ENTMCNC: 31.1 G/DL (ref 31.5–35.7)
MCV RBC AUTO: 95.2 FL (ref 79–97)
MONOCYTES # BLD AUTO: 0.5 10*3/MM3 (ref 0.1–0.9)
MONOCYTES NFR BLD AUTO: 11.8 % (ref 5–12)
MT JUNIPER IGE QN: <0.1 KU/L
MUGWORT IGE QN: <0.1 KU/L
NETTLE IGE QN: <0.1 KU/L
NEUTROPHILS # BLD AUTO: 2.01 10*3/MM3 (ref 1.7–7)
NEUTROPHILS NFR BLD AUTO: 47.3 % (ref 42.7–76)
NRBC BLD AUTO-RTO: 0 /100 WBC (ref 0–0.2)
P NOTATUM IGE QN: <0.1 KU/L
PLATELET # BLD AUTO: 258 10*3/MM3 (ref 140–450)
POTASSIUM SERPL-SCNC: 4.4 MMOL/L (ref 3.5–5.2)
PROT SERPL-MCNC: 6.8 G/DL (ref 6–8.5)
RBC # BLD AUTO: 4.19 10*6/MM3 (ref 3.77–5.28)
S BOTRYOSUM IGE QN: <0.1 KU/L
SHEEP SORREL IGE QN: <0.1 KU/L
SODIUM SERPL-SCNC: 142 MMOL/L (ref 136–145)
SWEET GUM IGE QN: <0.1 KU/L
T4 FREE SERPL-MCNC: 1.2 NG/DL (ref 0.93–1.7)
TRIGL SERPL-MCNC: 54 MG/DL (ref 0–150)
TSH SERPL DL<=0.005 MIU/L-ACNC: 1.51 UIU/ML (ref 0.45–4.5)
VLDLC SERPL CALC-MCNC: 10.8 MG/DL
WBC # BLD AUTO: 4.25 10*3/MM3 (ref 3.4–10.8)
WHITE ELM IGE QN: <0.1 KU/L
WHITE HICKORY IGE QN: <0.1 KU/L
WHITE MULBERRY IGE QN: <0.1 KU/L
WHITE OAK IGE QN: <0.1 KU/L

## 2019-08-19 DIAGNOSIS — Z77.120 SUSPECTED EXPOSURE TO MOLD: Primary | ICD-10-CM

## 2019-09-26 ENCOUNTER — OFFICE VISIT (OUTPATIENT)
Dept: INTERNAL MEDICINE | Facility: CLINIC | Age: 19
End: 2019-09-26

## 2019-09-26 VITALS
WEIGHT: 118 LBS | SYSTOLIC BLOOD PRESSURE: 98 MMHG | BODY MASS INDEX: 21.71 KG/M2 | HEART RATE: 88 BPM | HEIGHT: 62 IN | RESPIRATION RATE: 16 BRPM | DIASTOLIC BLOOD PRESSURE: 60 MMHG | TEMPERATURE: 98.1 F | OXYGEN SATURATION: 99 %

## 2019-09-26 DIAGNOSIS — R05.3 CHRONIC COUGH: Primary | ICD-10-CM

## 2019-09-26 DIAGNOSIS — K21.9 GASTROESOPHAGEAL REFLUX DISEASE, ESOPHAGITIS PRESENCE NOT SPECIFIED: ICD-10-CM

## 2019-09-26 DIAGNOSIS — Z11.8 SCREENING FOR CHLAMYDIAL DISEASE: ICD-10-CM

## 2019-09-26 PROCEDURE — 99214 OFFICE O/P EST MOD 30 MIN: CPT | Performed by: NURSE PRACTITIONER

## 2019-09-26 RX ORDER — OMEPRAZOLE 40 MG/1
40 CAPSULE, DELAYED RELEASE ORAL DAILY
Qty: 30 CAPSULE | Refills: 2 | Status: SHIPPED | OUTPATIENT
Start: 2019-09-26 | End: 2020-08-21

## 2019-09-28 DIAGNOSIS — A74.9 CHLAMYDIA: Primary | ICD-10-CM

## 2019-09-28 PROBLEM — Z86.19 HISTORY OF CHLAMYDIA INFECTION: Status: ACTIVE | Noted: 2019-09-28

## 2019-09-28 LAB
C TRACH RRNA SPEC QL NAA+PROBE: POSITIVE
N GONORRHOEA RRNA SPEC QL NAA+PROBE: NEGATIVE

## 2019-09-28 RX ORDER — DOXYCYCLINE HYCLATE 100 MG/1
100 CAPSULE ORAL 2 TIMES DAILY
Qty: 20 CAPSULE | Refills: 0 | Status: SHIPPED | OUTPATIENT
Start: 2019-09-28 | End: 2019-10-01

## 2019-09-30 ENCOUNTER — TELEPHONE (OUTPATIENT)
Dept: INTERNAL MEDICINE | Facility: CLINIC | Age: 19
End: 2019-09-30

## 2019-10-01 ENCOUNTER — TELEPHONE (OUTPATIENT)
Dept: INTERNAL MEDICINE | Facility: CLINIC | Age: 19
End: 2019-10-01

## 2019-10-01 DIAGNOSIS — A74.9 CHLAMYDIA: Primary | ICD-10-CM

## 2019-10-01 RX ORDER — ONDANSETRON 8 MG/1
8 TABLET, ORALLY DISINTEGRATING ORAL EVERY 8 HOURS PRN
Qty: 20 TABLET | Refills: 0 | Status: SHIPPED | OUTPATIENT
Start: 2019-10-01 | End: 2020-08-21

## 2019-10-01 RX ORDER — AZITHROMYCIN 1 G
1 PACKET (EA) ORAL ONCE
Qty: 1 PACKET | Refills: 0 | Status: SHIPPED | OUTPATIENT
Start: 2019-10-01 | End: 2019-10-01

## 2019-10-01 NOTE — TELEPHONE ENCOUNTER
Please call the patient in regards to her symptoms.  If she is having severe pain she should be [facility].  I will be glad to change her antibiotic to a different medication.  Please let me know her response

## 2019-10-01 NOTE — TELEPHONE ENCOUNTER
Have patient take a dose of Zofran 30 minutes prior to the antibiotic.  Have the patient take Zofran 8 hours later if she continues to have nausea.

## 2019-10-01 NOTE — TELEPHONE ENCOUNTER
Pt would like to switch antibiotics. Please send to [facility] in [place] on [address] and [address].

## 2019-10-02 ENCOUNTER — OFFICE VISIT (OUTPATIENT)
Dept: INTERNAL MEDICINE | Facility: CLINIC | Age: 19
End: 2019-10-02

## 2019-10-02 VITALS
HEART RATE: 90 BPM | BODY MASS INDEX: 21.83 KG/M2 | OXYGEN SATURATION: 96 % | TEMPERATURE: 98 F | DIASTOLIC BLOOD PRESSURE: 70 MMHG | WEIGHT: 118.6 LBS | SYSTOLIC BLOOD PRESSURE: 118 MMHG | RESPIRATION RATE: 16 BRPM | HEIGHT: 62 IN

## 2019-10-02 DIAGNOSIS — Z23 NEED FOR INFLUENZA VACCINATION: Primary | ICD-10-CM

## 2019-10-02 DIAGNOSIS — J01.10 ACUTE FRONTAL SINUSITIS, RECURRENCE NOT SPECIFIED: ICD-10-CM

## 2019-10-02 DIAGNOSIS — Z72.51 UNPROTECTED SEX: ICD-10-CM

## 2019-10-02 DIAGNOSIS — A74.9 POSITIVE CHLAMYDIA PCR: ICD-10-CM

## 2019-10-02 PROCEDURE — 90686 IIV4 VACC NO PRSV 0.5 ML IM: CPT | Performed by: NURSE PRACTITIONER

## 2019-10-02 PROCEDURE — 90471 IMMUNIZATION ADMIN: CPT | Performed by: NURSE PRACTITIONER

## 2019-10-02 PROCEDURE — 99213 OFFICE O/P EST LOW 20 MIN: CPT | Performed by: NURSE PRACTITIONER

## 2019-10-02 RX ORDER — CEFDINIR 300 MG/1
300 CAPSULE ORAL 2 TIMES DAILY
Qty: 20 CAPSULE | Refills: 0 | Status: SHIPPED | OUTPATIENT
Start: 2019-10-02 | End: 2020-08-21

## 2019-10-02 RX ORDER — AZITHROMYCIN 1 G
PACKET (EA) ORAL
COMMUNITY
Start: 2019-10-01 | End: 2020-08-24

## 2019-10-02 RX ORDER — METHYLPREDNISOLONE 4 MG/1
TABLET ORAL
Qty: 1 EACH | Refills: 0 | Status: SHIPPED | OUTPATIENT
Start: 2019-10-02 | End: 2020-08-21

## 2019-10-02 NOTE — TELEPHONE ENCOUNTER
LVM informing patient of new antibiotic and directions with zofran. Advised to call clinic back with any further questions. Provided office number.

## 2019-10-02 NOTE — PROGRESS NOTES
Chief Complaint   Patient presents with   • Allergic Rhinitis     2 mth f/u/  blood work         Subjective     History of Present Illness   Patient is here today for follow-up.  Patient cough over the last 2 months it was productive with pressure in her ears.  Her symptoms have not resolved.  She denies any fever.  No chest pain shortness of breath.  The patient was seen 9/26/2019.  This was approximately 1 week ago.  The patient was treated but also was due for a screening chlamydia test which came back positive.  Patient was put on doxycycline.  Patient had complaint of nausea vomiting.  The medication was changed to Zithromax 1 dose yesterday.  Patient was sent Zofran to premedicate before the Zithromax and she tolerated the Zithromax well.    The following portions of the patient's history were reviewed and updated as appropriate: allergies, current medications, past family history, past medical history, past social history, past surgical history and problem list.    Review of Systems   Constitutional: Negative for activity change, appetite change, chills, fatigue and fever.   HENT: Positive for congestion, postnasal drip and sinus pressure. Negative for sore throat.    Eyes: Negative for visual disturbance.   Respiratory: Positive for cough. Negative for shortness of breath.    Cardiovascular: Negative for chest pain, palpitations and leg swelling.   Gastrointestinal: Negative for abdominal pain, constipation, diarrhea, nausea and GERD.   Musculoskeletal: Negative for arthralgias and myalgias.   Skin: Negative for rash.   Allergic/Immunologic: Negative for environmental allergies.   Neurological: Negative for dizziness.   Psychiatric/Behavioral: Negative for sleep disturbance.   All other systems reviewed and are negative.      Objective   Physical Exam   Constitutional: She is oriented to person, place, and time. She appears well-developed and well-nourished.   HENT:   Head: Normocephalic and atraumatic.    Bilateral TMs retracted nasal mucosa boggy tenderness to palpate bilateral maxillary sinuses clear postnasal drainage   Eyes: Conjunctivae are normal. Right eye exhibits no discharge. Left eye exhibits no discharge.   Cardiovascular: Normal rate and regular rhythm.   Pulmonary/Chest: Effort normal and breath sounds normal.   Abdominal: Soft. Bowel sounds are normal. She exhibits no distension. There is no tenderness.   Lymphadenopathy:     She has no cervical adenopathy.   Neurological: She is alert and oriented to person, place, and time.   Skin: Skin is warm and dry. Capillary refill takes 2 to 3 seconds.   Psychiatric: She has a normal mood and affect. Her behavior is normal.   Nursing note and vitals reviewed.          Results for orders placed or performed in visit on 09/26/19   Chlamydia trachomatis, Neisseria gonorrhoeae, PCR - Urine, Cervix   Result Value Ref Range    Chlamydia trachomatis, SOSA Positive (A) Negative    Neisseria gonorrhoeae, SOSA Negative Negative        Assessment/Plan   Oksana was seen today for allergic rhinitis.    Diagnoses and all orders for this visit:    Need for influenza vaccination  -     Fluarix/Fluzone/Afluria Quad/FluLaval Quad    Acute frontal sinusitis, recurrence not specified  -     cefdinir (OMNICEF) 300 MG capsule; Take 1 capsule by mouth 2 (Two) Times a Day.  -     methylPREDNISolone (MEDROL, ARIELA,) 4 MG tablet; Take as directed on package instructions.    Positive Chlamydia PCR  -     HIV-1 / O / 2 Ag / Antibody 4th Generation; Future  -     HSV 1 & 2 - Specific Antibody, IgG; Future  -     HSV Non-Specific Antibody, IgM; Future  -     RPR; Future  -     Chlamydia trachomatis, Neisseria gonorrhoeae, PCR - , Cervix; Future  -     Hepatitis C Antibody; Future    Unprotected sex  -     HIV-1 / O / 2 Ag / Antibody 4th Generation; Future  -     HSV 1 & 2 - Specific Antibody, IgG; Future  -     HSV Non-Specific Antibody, IgM; Future  -     RPR; Future  -     Chlamydia  trachomatis, Neisseria gonorrhoeae, PCR - , Cervix; Future  -     Hepatitis C Antibody; Future      Reviewed safe protective intercourse to prevent STDs including 2 forms of birth control including condoms.  Also reviewed abstinence for best outcome of prevention of STD.  Patient verbalizes understanding.  The patient is spoken with her partner and previous partner who she reports had this infection but did not know until she spoke with him.    Return if symptoms worsen or fail to improve.  RTC/call  If symptoms worsen  Meds MOA and SE's reviewed and pt v/u

## 2019-10-06 ENCOUNTER — DOCUMENTATION (OUTPATIENT)
Dept: INTERNAL MEDICINE | Facility: CLINIC | Age: 19
End: 2019-10-06

## 2019-10-06 ENCOUNTER — TELEPHONE (OUTPATIENT)
Dept: INTERNAL MEDICINE | Facility: CLINIC | Age: 19
End: 2019-10-06

## 2019-10-07 ENCOUNTER — LAB (OUTPATIENT)
Dept: INTERNAL MEDICINE | Facility: CLINIC | Age: 19
End: 2019-10-07

## 2019-10-07 DIAGNOSIS — Z72.51 UNPROTECTED SEX: ICD-10-CM

## 2019-10-07 DIAGNOSIS — A74.9 POSITIVE CHLAMYDIA PCR: ICD-10-CM

## 2019-10-07 PROCEDURE — 36415 COLL VENOUS BLD VENIPUNCTURE: CPT | Performed by: NURSE PRACTITIONER

## 2019-10-08 LAB
C TRACH RRNA SPEC QL NAA+PROBE: NEGATIVE
HCV AB S/CO SERPL IA: <0.1 S/CO RATIO (ref 0–0.9)
HIV 1+2 AB+HIV1 P24 AG SERPL QL IA: NON REACTIVE
HSV1 IGG SER IA-ACNC: 21.5 INDEX (ref 0–0.9)
HSV1+2 IGM SER IA-ACNC: 2.49 RATIO (ref 0–0.9)
HSV2 IGG SER IA-ACNC: <0.91 INDEX (ref 0–0.9)
N GONORRHOEA RRNA SPEC QL NAA+PROBE: NEGATIVE
RPR SER QL: NON REACTIVE

## 2019-11-04 PROBLEM — J32.9 SINUSITIS: Status: ACTIVE | Noted: 2019-11-04

## 2019-11-04 PROBLEM — T78.40XA ALLERGIES: Status: ACTIVE | Noted: 2019-11-04

## 2020-06-11 NOTE — TELEPHONE ENCOUNTER
----- Message from Oksana Aguirre sent at 7/19/2018  5:58 PM EDT -----  Regarding: Non-Urgent Medical Question  Contact: [phone]  I haven't had time to call you when you're open so I thought it would be easier to message on here, the panic attack feeling has went away, I think it helped with some of my symptoms it just made me feel like I was constantly having a panic attack and people around me were noticing it too. Just trying to contact you back as I was asked yesterday!  
CALLED PATIENT, SPOKE WITH THE PATIENT, PATIENT IS AWARE OF THE PROVIDER SENDING IN ANOTHER MEDICATION. PATIENT VERBALIZED UNDERSTANDING.   
Patient messaged stating the panic attack feeling has went away. Would you like to try her on another medication now? Please advise. Thanks!!   
[Negative] : Heme/Lymph

## 2020-08-21 ENCOUNTER — OFFICE VISIT (OUTPATIENT)
Dept: INTERNAL MEDICINE | Facility: CLINIC | Age: 20
End: 2020-08-21

## 2020-08-21 VITALS
BODY MASS INDEX: 20.46 KG/M2 | DIASTOLIC BLOOD PRESSURE: 78 MMHG | RESPIRATION RATE: 19 BRPM | TEMPERATURE: 98.9 F | WEIGHT: 111.2 LBS | OXYGEN SATURATION: 97 % | HEART RATE: 90 BPM | HEIGHT: 62 IN | SYSTOLIC BLOOD PRESSURE: 120 MMHG

## 2020-08-21 DIAGNOSIS — L08.9 SKIN INFECTION: ICD-10-CM

## 2020-08-21 DIAGNOSIS — D22.9 ATYPICAL NEVI: Primary | ICD-10-CM

## 2020-08-21 PROBLEM — Z86.69 HISTORY OF FREQUENT EAR INFECTIONS: Status: ACTIVE | Noted: 2020-08-21

## 2020-08-21 PROBLEM — R22.1 LUMP IN NECK: Status: ACTIVE | Noted: 2019-05-15

## 2020-08-21 PROBLEM — M54.2 NECK PAIN OF OVER 3 MONTHS DURATION: Status: ACTIVE | Noted: 2020-08-21

## 2020-08-21 PROCEDURE — 99213 OFFICE O/P EST LOW 20 MIN: CPT | Performed by: NURSE PRACTITIONER

## 2020-08-21 RX ORDER — SULFAMETHOXAZOLE AND TRIMETHOPRIM 800; 160 MG/1; MG/1
1 TABLET ORAL 2 TIMES DAILY
Qty: 20 TABLET | Refills: 0 | OUTPATIENT
Start: 2020-08-21 | End: 2021-01-28

## 2020-08-21 NOTE — PROGRESS NOTES
Chief Complaint   Patient presents with   • Nevus   • Mass     x2 days, throat        Subjective     History of Present Illness   Patient is in clinic today with complaints of itchy painful mole on breast  L breast mole changed itching 1 week ago happened fast and 2 d ago had 2 nodes in neck swell and hurt. fever flu like sxs.   Mole looks like it has changed.     The following portions of the patient's history were reviewed and updated as appropriate: allergies, current medications, past family history, past medical history, past social history, past surgical history and problem list.      Current Outpatient Medications:   •  azithromycin (ZITHROMAX) 1 g powder, , Disp: , Rfl:   •  sulfamethoxazole-trimethoprim (Bactrim DS) 800-160 MG per tablet, Take 1 tablet by mouth 2 (Two) Times a Day., Disp: 20 tablet, Rfl: 0    Vitals:    08/21/20 1407   BP: 120/78   Pulse: 90   Resp: 19   Temp: 98.9 °F (37.2 °C)   SpO2: 97%       Body mass index is 20.33 kg/m².        Review of Systems   Constitutional: Negative for activity change, appetite change, chills, fatigue and fever.   HENT: Negative for congestion, postnasal drip and sore throat.    Eyes: Negative for visual disturbance.   Respiratory: Negative for cough and shortness of breath.    Cardiovascular: Negative for chest pain, palpitations and leg swelling.   Gastrointestinal: Negative for abdominal pain, constipation, diarrhea, nausea and GERD.   Musculoskeletal: Negative for arthralgias and myalgias.   Skin: Negative for rash.   Allergic/Immunologic: Negative for environmental allergies.   Neurological: Negative for dizziness.   Psychiatric/Behavioral: Negative for sleep disturbance.   All other systems reviewed and are negative.      Objective   Physical Exam   Constitutional: She is oriented to person, place, and time. She appears well-developed and well-nourished.   HENT:   Head: Normocephalic and atraumatic.   Cardiovascular: Normal rate and regular rhythm.    Pulmonary/Chest: Effort normal and breath sounds normal.   Abdominal: Soft. Bowel sounds are normal. She exhibits no distension. There is no tenderness.   Musculoskeletal: She exhibits no edema.   L breast 3 o clock 3 in from aereola 3mm brown raised mole     Lymphadenopathy:     She has no cervical adenopathy.   Neurological: She is alert and oriented to person, place, and time.   Skin: Skin is warm and dry. Capillary refill takes 2 to 3 seconds.   Psychiatric: She has a normal mood and affect. Her behavior is normal.   Nursing note and vitals reviewed.          Assessment/Plan   Oksana was seen today for nevus and mass.    Diagnoses and all orders for this visit:    Atypical nevi  -     Ambulatory Referral to Dermatology    Skin infection  -     sulfamethoxazole-trimethoprim (Bactrim DS) 800-160 MG per tablet; Take 1 tablet by mouth 2 (Two) Times a Day.    Will set up physical exam.  Will follow-up if symptoms or not improving.  Atypical nevi for Derm to eval with removal.  Secondary skin infection -secondary skin infection start Bactrim if no change in symptoms to let us know.      RTC/call  If symptoms worsen  Meds MOA and SE's reviewed and pt v/u

## 2020-08-22 ENCOUNTER — TELEPHONE (OUTPATIENT)
Dept: INTERNAL MEDICINE | Facility: CLINIC | Age: 20
End: 2020-08-22

## 2020-08-22 NOTE — TELEPHONE ENCOUNTER
Oksana called says she was seen on Friday for a lump on her neck as well as a mole tht has been rapidly changing. She is now having pain in her lower back (above hip area) that also has some bruising with it. She would like to know if she needs to schedule an appointment? Please advise.     Oksana can be contacted at [phone]

## 2020-08-25 NOTE — PATIENT INSTRUCTIONS
MyPlate from USDA    MyPlate is an outline of a general healthy diet based on the 2010 Dietary Guidelines for Americans, from the U.S. Department of Agriculture (USDA). It sets guidelines for how much food you should eat from each food group based on your age, sex, and level of physical activity.  What are tips for following MyPlate?  To follow MyPlate recommendations:  · Eat a wide variety of fruits and vegetables, grains, and protein foods.  · Serve smaller portions and eat less food throughout the day.  · Limit portion sizes to avoid overeating.  · Enjoy your food.  · Get at least 150 minutes of exercise every week. This is about 30 minutes each day, 5 or more days per week.  It can be difficult to have every meal look like MyPlate. Think about MyPlate as eating guidelines for an entire day, rather than each individual meal.  Fruits and vegetables  · Make half of your plate fruits and vegetables.  · Eat many different colors of fruits and vegetables each day.  · For a 2,000 calorie daily food plan, eat:  ? 2½ cups of vegetables every day.  ? 2 cups of fruit every day.  · 1 cup is equal to:  ? 1 cup raw or cooked vegetables.  ? 1 cup raw fruit.  ? 1 medium-sized orange, apple, or banana.  ? 1 cup 100% fruit or vegetable juice.  ? 2 cups raw leafy greens, such as lettuce, spinach, or kale.  ? ½ cup dried fruit.  Grains  · One fourth of your plate should be grains.  · Make at least half of the grains you eat each day whole grains.  · For a 2,000 calorie daily food plan, eat 6 oz of grains every day.  · 1 oz is equal to:  ? 1 slice bread.  ? 1 cup cereal.  ? ½ cup cooked rice, cereal, or pasta.  Protein  · One fourth of your plate should be protein.  · Eat a wide variety of protein foods, including meat, poultry, fish, eggs, beans, nuts, and tofu.  · For a 2,000 calorie daily food plan, eat 5½ oz of protein every day.  · 1 oz is equal to:  ? 1 oz meat, poultry, or fish.  ? ¼ cup cooked beans.  ? 1 egg.  ? ½ oz nuts  or seeds.  ? 1 Tbsp peanut butter.  Dairy  · Drink fat-free or low-fat (1%) milk.  · Eat or drink dairy as a side to meals.  · For a 2,000 calorie daily food plan, eat or drink 3 cups of dairy every day.  · 1 cup is equal to:  ? 1 cup milk, yogurt, cottage cheese, or soy milk (soy beverage).  ? 2 oz processed cheese.  ? 1½ oz natural cheese.  Fats, oils, salt, and sugars  · Only small amounts of oils are recommended.  · Avoid foods that are high in calories and low in nutritional value (empty calories), like foods high in fat or added sugars.  · Choose foods that are low in salt (sodium). Choose foods that have less than 140 milligrams (mg) of sodium per serving.  · Drink water instead of sugary drinks. Drink enough water each day to keep your urine pale yellow.  Where to find support  · Work with your health care provider or a nutrition specialist (dietitian) to develop a customized eating plan that is right for you.  · Download an kathy (mobile application) to help you track your daily food intake.  Where to find more information  · Go to ChooseMyPlate.gov for more information.  Summary  · MyPlate is a general guideline for healthy eating from the USDA. It is based on the 2010 Dietary Guidelines for Americans.  · In general, fruits and vegetables should take up ½ of your plate, grains should take up ¼ of your plate, and protein should take up ¼ of your plate.  This information is not intended to replace advice given to you by your health care provider. Make sure you discuss any questions you have with your health care provider.  Document Released: 01/06/2009 Document Revised: 05/21/2020 Document Reviewed: 03/19/2018  Elsevier Patient Education © 2020 Elsevier Inc.

## 2020-09-25 ENCOUNTER — OFFICE VISIT (OUTPATIENT)
Dept: INTERNAL MEDICINE | Facility: CLINIC | Age: 20
End: 2020-09-25

## 2020-09-25 VITALS
BODY MASS INDEX: 20.32 KG/M2 | HEART RATE: 72 BPM | WEIGHT: 111.13 LBS | TEMPERATURE: 98.8 F | OXYGEN SATURATION: 99 % | SYSTOLIC BLOOD PRESSURE: 110 MMHG | RESPIRATION RATE: 18 BRPM | DIASTOLIC BLOOD PRESSURE: 80 MMHG

## 2020-09-25 DIAGNOSIS — J02.9 PHARYNGITIS, UNSPECIFIED ETIOLOGY: ICD-10-CM

## 2020-09-25 DIAGNOSIS — R59.1 LYMPHADENOPATHY: Primary | ICD-10-CM

## 2020-09-25 PROCEDURE — 99213 OFFICE O/P EST LOW 20 MIN: CPT | Performed by: NURSE PRACTITIONER

## 2020-09-25 PROCEDURE — U0003 INFECTIOUS AGENT DETECTION BY NUCLEIC ACID (DNA OR RNA); SEVERE ACUTE RESPIRATORY SYNDROME CORONAVIRUS 2 (SARS-COV-2) (CORONAVIRUS DISEASE [COVID-19]), AMPLIFIED PROBE TECHNIQUE, MAKING USE OF HIGH THROUGHPUT TECHNOLOGIES AS DESCRIBED BY CMS-2020-01-R: HCPCS | Performed by: NURSE PRACTITIONER

## 2020-09-25 RX ORDER — AMOXICILLIN 875 MG/1
875 TABLET, COATED ORAL EVERY 12 HOURS SCHEDULED
Qty: 20 TABLET | Refills: 0 | OUTPATIENT
Start: 2020-09-25 | End: 2021-01-28

## 2020-09-28 ENCOUNTER — TELEPHONE (OUTPATIENT)
Dept: INTERNAL MEDICINE | Facility: CLINIC | Age: 20
End: 2020-09-28

## 2020-09-28 LAB
ALBUMIN SERPL-MCNC: 4.7 G/DL (ref 3.5–5.2)
ALBUMIN/GLOB SERPL: 2.4 G/DL
ALP SERPL-CCNC: 63 U/L (ref 39–117)
ALT SERPL-CCNC: 9 U/L (ref 1–33)
ASO AB SERPL-ACNC: 121.6 IU/ML (ref 0–200)
AST SERPL-CCNC: 15 U/L (ref 1–32)
B BURGDOR IGG+IGM SER-ACNC: <0.91 ISR (ref 0–0.9)
B HENSELAE IGG TITR SER IF: NEGATIVE TITER
B HENSELAE IGM TITR SER IF: NEGATIVE TITER
B QUINTANA IGG TITR SER IF: NEGATIVE TITER
B QUINTANA IGM TITR SER IF: NEGATIVE TITER
BILIRUB SERPL-MCNC: 0.3 MG/DL (ref 0–1.2)
BUN SERPL-MCNC: 8 MG/DL (ref 6–20)
BUN/CREAT SERPL: 10.4 (ref 7–25)
CALCIUM SERPL-MCNC: 9 MG/DL (ref 8.6–10.5)
CHLORIDE SERPL-SCNC: 104 MMOL/L (ref 98–107)
CMV IGG SERPL IA-ACNC: 8.9 U/ML (ref 0–0.59)
CMV IGM SERPL IA-ACNC: <30 AU/ML (ref 0–29.9)
CO2 SERPL-SCNC: 25.8 MMOL/L (ref 22–29)
CREAT SERPL-MCNC: 0.77 MG/DL (ref 0.57–1)
EBV NA IGG SER IA-ACNC: <18 U/ML (ref 0–17.9)
EBV VCA IGG SER IA-ACNC: <18 U/ML (ref 0–17.9)
EBV VCA IGM SER IA-ACNC: <36 U/ML (ref 0–35.9)
GLOBULIN SER CALC-MCNC: 2 GM/DL
GLUCOSE SERPL-MCNC: 93 MG/DL (ref 65–99)
POTASSIUM SERPL-SCNC: 4.2 MMOL/L (ref 3.5–5.2)
PROT SERPL-MCNC: 6.7 G/DL (ref 6–8.5)
SERVICE CMNT-IMP: NORMAL
SODIUM SERPL-SCNC: 138 MMOL/L (ref 136–145)

## 2020-09-28 NOTE — TELEPHONE ENCOUNTER
Oksana Aguirre [phone]  Spoke to pt, advised of COVID test results. Good verbal understanding. Closing call.

## 2020-09-28 NOTE — TELEPHONE ENCOUNTER
----- Message from REJI Mcneil sent at 9/27/2020  9:40 PM EDT -----  Please let the pt know the sars test is negative.

## 2021-01-28 PROCEDURE — U0004 COV-19 TEST NON-CDC HGH THRU: HCPCS | Performed by: FAMILY MEDICINE

## 2021-02-08 ENCOUNTER — TELEPHONE (OUTPATIENT)
Dept: INTERNAL MEDICINE | Facility: CLINIC | Age: 21
End: 2021-02-08

## 2021-02-08 ENCOUNTER — HOSPITAL ENCOUNTER (EMERGENCY)
Facility: HOSPITAL | Age: 21
Discharge: HOME | End: 2021-02-08
Attending: EMERGENCY MEDICINE | Admitting: EMERGENCY MEDICINE

## 2021-02-08 VITALS
SYSTOLIC BLOOD PRESSURE: 123 MMHG | TEMPERATURE: 98 F | RESPIRATION RATE: 16 BRPM | WEIGHT: 115 LBS | BODY MASS INDEX: 21.16 KG/M2 | HEIGHT: 62 IN | HEART RATE: 89 BPM | DIASTOLIC BLOOD PRESSURE: 79 MMHG | OXYGEN SATURATION: 100 %

## 2021-02-08 DIAGNOSIS — R42 LIGHTHEADEDNESS: Primary | ICD-10-CM

## 2021-02-08 LAB
B-HCG UR QL: NEGATIVE
BASE EXCESS BLDA CALC-SCNC: 0 MMOL/L (ref -5–5)
CA-I BLDA-SCNC: 1.27 MMOL/L (ref 1.2–1.32)
CO2 BLDA-SCNC: 26 MMOL/L (ref 24–29)
GLUCOSE BLDC GLUCOMTR-MCNC: 83 MG/DL (ref 70–130)
HCO3 BLDA-SCNC: 25 MMOL/L (ref 22–26)
HCT VFR BLDA CALC: 45 % (ref 38–51)
HGB BLDA-MCNC: 15.3 G/DL (ref 12–17)
INTERNAL NEGATIVE CONTROL: NEGATIVE
INTERNAL POSITIVE CONTROL: POSITIVE
Lab: NORMAL
PCO2 BLDA: 42.5 MM HG (ref 35–45)
PH BLDA: 7.38 PH UNITS (ref 7.35–7.6)
PO2 BLDA: 32 MMHG (ref 80–105)
POTASSIUM BLDA-SCNC: 3.8 MMOL/L (ref 3.5–4.9)
SAO2 % BLDA: 60 % (ref 95–98)
SODIUM BLD-SCNC: 142 MMOL/L (ref 138–146)

## 2021-02-08 PROCEDURE — 82947 ASSAY GLUCOSE BLOOD QUANT: CPT

## 2021-02-08 PROCEDURE — 81025 URINE PREGNANCY TEST: CPT | Performed by: EMERGENCY MEDICINE

## 2021-02-08 PROCEDURE — 84132 ASSAY OF SERUM POTASSIUM: CPT

## 2021-02-08 PROCEDURE — 99283 EMERGENCY DEPT VISIT LOW MDM: CPT

## 2021-02-08 PROCEDURE — 82803 BLOOD GASES ANY COMBINATION: CPT

## 2021-02-08 PROCEDURE — 93005 ELECTROCARDIOGRAM TRACING: CPT | Performed by: EMERGENCY MEDICINE

## 2021-02-08 PROCEDURE — 84295 ASSAY OF SERUM SODIUM: CPT

## 2021-02-08 PROCEDURE — 85014 HEMATOCRIT: CPT

## 2021-02-08 PROCEDURE — 82330 ASSAY OF CALCIUM: CPT

## 2021-02-08 NOTE — TELEPHONE ENCOUNTER
Pt called stating she is having Dizziness with blurred vision unable to concentrate for couple days. No other Sx.   Pt advised per Kiesha (VERBAL CONVERSATION) to be evaluated at the ER. Pt verbalized good understanding and added she will do just and have someone drive her there.

## 2021-02-09 NOTE — ED PROVIDER NOTES
EMERGENCY DEPARTMENT ENCOUNTER      Pt Name: Oksana Aguirre  MRN: [id number]  YOB: 2000  Date of evaluation: 2/8/2021  Provider: Reilly Parks MD    CHIEF COMPLAINT       Chief Complaint   Patient presents with   • Dizziness         HISTORY OF PRESENT ILLNESS  (Location/Symptom, Timing/Onset, Context/Setting, Quality, Duration, Modifying Factors, Severity.)   Oksana Aguirre is a 20 y.o. female who presents to the emergency department with lightheadedness on standing that is been going on for the past 2 to 3 days.  Patient has history of similar episodes in the past.  Patient states that when she stands up from a seated position she becomes lightheaded and developed tunnel vision.  She states that this usually resolves after about a minute.  These episodes are not associated with any headache, palpitations, chest pain, shortness of breath, or abdominal pain.  Patient denies heavy menstrual cycles or rectal bleeding.  She states that her oral intake has been normal.  She denies any associated neurologic symptoms including any blurred vision, weakness or numbness in her extremities, or difficulty ambulating.      Nursing notes were reviewed.    REVIEW OF SYSTEMS    (2-9 systems for level 4, 10 or more for level 5)   ROS:  General:  No fevers, no chills, no weakness  Cardiovascular:  No chest pain, no palpitations  Respiratory:  No shortness of breath, no cough, no wheezing  Gastrointestinal:  No pain, no nausea, no vomiting, no diarrhea  Musculoskeletal:  No muscle pain, no joint pain  Skin:  No rash, no easy bruising  Neurologic:  No speech problems, no headache, no extremity numbness, no extremity tingling, no extremity weakness  Psychiatric:  No anxiety  Genitourinary:  No dysuria, no hematuria    Except as noted above the remainder of the review of systems was reviewed and negative.       PAST MEDICAL HISTORY     Past Medical History:   Diagnosis Date   • Hypertension    • Migraine           SURGICAL HISTORY       Past Surgical History:   Procedure Laterality Date   • ADENOIDECTOMY     • EAR TUBES Bilateral    • INNER EAR SURGERY Right          CURRENT MEDICATIONS     No current facility-administered medications for this encounter.     Current Outpatient Medications:   •  promethazine-dextromethorphan (PROMETHAZINE-DM) 6.25-15 MG/5ML syrup, Take 5 mL by mouth 4 (Four) Times a Day As Needed for Cough., Disp: 118 mL, Rfl: 0    ALLERGIES     Doxycycline    FAMILY HISTORY       Family History   Problem Relation Age of Onset   • Alcohol abuse Father    • Hypertension Maternal Grandmother    • Anemia Maternal Grandmother    • Cancer Maternal Grandmother    • Cancer Maternal Grandfather    • Parkinsonism Paternal Grandfather           SOCIAL HISTORY       Social History     Socioeconomic History   • Marital status: Single     Spouse name: Not on file   • Number of children: Not on file   • Years of education: Not on file   • Highest education level: Not on file   Occupational History   • Occupation: student   Tobacco Use   • Smoking status: Never Smoker   • Smokeless tobacco: Never Used   Substance and Sexual Activity   • Alcohol use: No   • Drug use: No   • Sexual activity: Defer     Birth control/protection: Injection   Social History Narrative    Mother and grandmother with pt today         PHYSICAL EXAM    (up to 7 for level 4, 8 or more for level 5)     Vitals:    02/08/21 2235 02/08/21 2240 02/08/21 2245 02/08/21 2301   BP: 123/79   123/79   BP Location:    Right arm   Patient Position:    Lying   Pulse:    89   Resp:    16   Temp:       TempSrc:       SpO2: 100% 100% 100% 100%   Weight:       Height:           Physical Exam  General: Awake, alert, no acute distress.  HEENT: Conjunctiva normal.  Neck: Trachea midline.  Cardiac: Heart regular rate, rhythm, no murmurs, rubs, or gallops  Lungs: Lungs are clear to auscultation, there is no wheezing, rhonchi, or rales. There is no use of accessory  muscles.  Chest wall: There is no tenderness to palpation over the chest wall or over ribs  Abdomen: Abdomen is soft, nontender, nondistended. There are no firm or pulsatile masses, no rebound rigidity or guarding.   Musculoskeletal: No deformity.  Neuro: Alert and oriented x4.  Cranial nerves II through XII are grossly intact.  There are no visual field deficits.  Cerebellar function intact with finger-to-nose and heel-to-shin testing.  Patient observed ambulating by myself and there is no evidence of ataxia or other gait abnormality.  Motor strength is intact in the face and strength is 5 out of 5 in all 4 extremities without any asymmetry.  Sensation to light touch is intact in all 4 extremities without any asymmetry.  Dermatology: Skin is warm and dry  Psych: Mentation is grossly normal, cognition is grossly normal. Affect is appropriate.        DIAGNOSTIC RESULTS     EKG: All EKG's are interpreted by the Emergency Department Physician who either signs or Co-signs this chart in the absence of a cardiologist.    Sinus rhythm rate of 86, no acute ST segment or T wave changes, normal intervals, no ectopy        LABS:    I have reviewed and interpreted all of the currently available lab results from this visit (if applicable):  Results for orders placed or performed during the hospital encounter of 02/08/21   POCT Pregnancy, Urine    Specimen: Urine   Result Value Ref Range    HCG, Urine, QL Negative Negative    Lot Number [id number]     Internal Positive Control Positive     Internal Negative Control Negative    POC Surgery Labs    Specimen: Blood   Result Value Ref Range    Ionized Calcium 1.27 1.20 - 1.32 mmol/L    POC Potassium 3.8 3.5 - 4.9 mmol/L    Sodium 142 138 - 146 mmol/L    Total CO2 26 24 - 29 mmol/L    Hemoglobin 15.3 12.0 - 17.0 g/dL    Hematocrit 45 38 - 51 %    pCO2, Arterial 42.5 35 - 45 mm Hg    pO2, Arterial 32 (L) 80 - 105 mmHg    Base Excess 0.0000 -5 - 5 mmol/L    O2 Saturation, Arterial 60 (L) 95  - 98 %    pH, Arterial 7.38 7.35 - 7.6 pH units    HCO3, Arterial 25.0 22 - 26 mmol/L    Glucose 83 70 - 130 mg/dL   ECG 12 Lead   Result Value Ref Range    QT Interval 366 ms    QTC Interval 437 ms        All other labs were within normal range or not returned as of this dictation.      EMERGENCY DEPARTMENT COURSE and DIFFERENTIAL DIAGNOSIS/MDM:   Vitals:    Vitals:    02/08/21 2235 02/08/21 2240 02/08/21 2245 02/08/21 2301   BP: 123/79   123/79   BP Location:    Right arm   Patient Position:    Lying   Pulse:    89   Resp:    16   Temp:       TempSrc:       SpO2: 100% 100% 100% 100%   Weight:       Height:           ED Course as of Feb 11 0401   Mon Feb 08, 2021 2239 Patient presents with multiple episodes of presyncope over the past couple of days that seem to be positional in nature.  She notes that when she stands up quickly she develops some tunnel vision, warm sensation, sweatiness, and feels as if she is going to pass out without any associated headache, chest pain, shortness of breath, palpitations, or history of bleeding.  Her work-up is unremarkable and ECG demonstrates no evidence of ischemia, dysrhythmia, HOCM/ARVC/Brugada as well as no interval abnormality.  Laboratory evaluation is negative for any significant electrolyte derangement or anemia.  Pregnancy test is also negative, ruling out pregnancy symptoms and ectopic.  Her neurologic exam is completely normal with no focal deficits.  I feel that she is appropriate for discharge home with close outpatient follow-up with her primary care provider.    [NS]      ED Course User Index  [NS] Reilly Parks MD       Patient very well-appearing with what sounds like orthostatic presyncope during the course of her emergency department stay.  She has no other findings concerning for emergent neurovascular, cardiovascular, or hemorrhagic/hypovolemic process.  Feel that she is appropriate for discharge home with close outpatient follow-up with her primary  care provider.    I had a discussion with the patient/family regarding diagnosis, diagnostic results, treatment plan, and medications.  The patient/family indicated understanding of these instructions.  I spent adequate time at the bedside preceding discharge necessary to personally discuss the aftercare instructions, giving patient education, providing explanations of the results of our evaluations/findings, and my decision making to assure that the patient/family understand the plan of care.  Time was allotted to answer questions at that time and throughout the ED course.  Emphasis was placed on timely follow-up after discharge.  I also discussed the potential for the development of an acute emergent condition requiring further evaluation, admission, or even surgical intervention. I discussed that we found nothing during the visit today indicating the need for further workup, admission, or the presence of an unstable medical condition.  I encouraged the patient to return to the emergency department immediately for ANY concerns, worsening, new complaints, or if symptoms persist and unable to seek follow-up in a timely fashion.  The patient/family expressed understanding and agreement with this plan.  The patient will follow-up with their PCP in 1-2 days for reevaluation.           FINAL IMPRESSION      1. Lightheadedness          DISPOSITION/PLAN     ED Disposition     ED Disposition Condition Comment    Discharge Stable           PATIENT REFERRED TO:  Rizwana Odonnell, APRN  [address]  [address]  [place] [id number] [phone]    Schedule an appointment as soon as possible for a visit in 2 days      [facility] Emergency Department  [address] [facility] [address] [phone]    If symptoms worsen      DISCHARGE MEDICATIONS:     Medication List      CONTINUE taking these medications    promethazine-dextromethorphan 6.25-15 MG/5ML syrup  Commonly known as:  PROMETHAZINE-DM  Take 5 mL by mouth 4 (Four) Times a Day As Needed for Cough.                Comment: Please note this report has been produced using speech recognition software.      Reilly Parks MD  Attending Emergency Physician               Reilly Parks MD  02/11/21 3960

## 2021-02-11 LAB
QT INTERVAL: 366 MS
QTC INTERVAL: 437 MS

## 2021-06-13 ENCOUNTER — HOSPITAL ENCOUNTER (EMERGENCY)
Facility: HOSPITAL | Age: 21
Discharge: HOME | End: 2021-06-13
Attending: EMERGENCY MEDICINE | Admitting: EMERGENCY MEDICINE

## 2021-06-13 VITALS
OXYGEN SATURATION: 99 % | SYSTOLIC BLOOD PRESSURE: 118 MMHG | WEIGHT: 120 LBS | RESPIRATION RATE: 16 BRPM | HEART RATE: 100 BPM | BODY MASS INDEX: 22.08 KG/M2 | HEIGHT: 62 IN | DIASTOLIC BLOOD PRESSURE: 85 MMHG | TEMPERATURE: 97.6 F

## 2021-06-13 DIAGNOSIS — R11.2 NAUSEA AND VOMITING, INTRACTABILITY OF VOMITING NOT SPECIFIED, UNSPECIFIED VOMITING TYPE: ICD-10-CM

## 2021-06-13 DIAGNOSIS — F10.920 ALCOHOLIC INTOXICATION WITHOUT COMPLICATION (HCC): Primary | ICD-10-CM

## 2021-06-13 PROCEDURE — 99283 EMERGENCY DEPT VISIT LOW MDM: CPT

## 2021-06-13 RX ORDER — SODIUM CHLORIDE 0.9 % (FLUSH) 0.9 %
10 SYRINGE (ML) INJECTION AS NEEDED
Status: DISCONTINUED | OUTPATIENT
Start: 2021-06-13 | End: 2021-06-13

## 2021-06-13 NOTE — ED PROVIDER NOTES
[facility]    EMERGENCY DEPARTMENT ENCOUNTER      Pt Name: Oksana Aguirre  MRN: [id number]  YOB: 2000  Date of evaluation: 6/13/2021  Provider: Reilly Parks MD    CHIEF COMPLAINT       Chief Complaint   Patient presents with   • Alcohol Intoxication         HISTORY OF PRESENT ILLNESS  (Location/Symptom, Timing/Onset, Context/Setting, Quality, Duration, Modifying Factors, Severity.)   Oksana Aguirre is a 20 y.o. female who presents to the emergency department after drinking alcohol earlier tonight.  Patient states that she very quickly drink for tequila shots and became extremely nauseous.  She states that she vomited 2-3 times and felt very dizzy.  She denies any additional substance ingestion and denies any other complaints at this time including any abdominal pain, fever, chills, or recent illness.  She has no known medical problems and takes no regular medications.  She states that since vomiting she does feel much better.      Nursing notes were reviewed.    REVIEW OF SYSTEMS    (2-9 systems for level 4, 10 or more for level 5)   ROS:  General:  No fevers, no chills, no weakness  Cardiovascular:  No chest pain, no palpitations  Respiratory:  No shortness of breath, no cough, no wheezing  Gastrointestinal: Vomiting  Musculoskeletal:  No muscle pain, no joint pain  Skin:  No rash  Neurologic:  No speech problems, no headache, no extremity numbness, no extremity tingling, no extremity weakness  Psychiatric:  No anxiety  Genitourinary:  No dysuria, no hematuria    Except as noted above the remainder of the review of systems was reviewed and negative.       PAST MEDICAL HISTORY     Past Medical History:   Diagnosis Date   • Hypertension    • Migraine          SURGICAL HISTORY       Past Surgical History:   Procedure Laterality Date   • ADENOIDECTOMY     • EAR TUBES Bilateral    • INNER EAR SURGERY Right          CURRENT MEDICATIONS     No current facility-administered medications for this  "encounter.    Current Outpatient Medications:   •  hydrOXYzine (ATARAX) 25 MG tablet, Take 1 tablet by mouth Every 6 (Six) Hours As Needed for Itching., Disp: 20 tablet, Rfl: 0  •  nystatin (MYCOSTATIN) 407672 UNIT/GM cream, Use 2 X daily on affected areas then continue 2 weeeks after rash resolves., Disp: 60 g, Rfl: 1  •  predniSONE (DELTASONE) 20 MG tablet, 3 tabs for 2 days, 2 tabs for 2 days, 1 tab for 2 days, Disp: 12 tablet, Rfl: 0  •  promethazine-dextromethorphan (PROMETHAZINE-DM) 6.25-15 MG/5ML syrup, Take 5 mL by mouth 4 (Four) Times a Day As Needed for Cough., Disp: 118 mL, Rfl: 0    ALLERGIES     Doxycycline    FAMILY HISTORY       Family History   Problem Relation Age of Onset   • Alcohol abuse Father    • Hypertension Maternal Grandmother    • Anemia Maternal Grandmother    • Cancer Maternal Grandmother    • Cancer Maternal Grandfather    • Parkinsonism Paternal Grandfather           SOCIAL HISTORY       Social History     Socioeconomic History   • Marital status: Single     Spouse name: Not on file   • Number of children: Not on file   • Years of education: Not on file   • Highest education level: Not on file   Tobacco Use   • Smoking status: Never Smoker   • Smokeless tobacco: Never Used   Vaping Use   • Vaping Use: Never used   Substance and Sexual Activity   • Alcohol use: Yes   • Drug use: No   • Sexual activity: Defer     Birth control/protection: Injection         PHYSICAL EXAM    (up to 7 for level 4, 8 or more for level 5)     Vitals:    06/13/21 0101   BP: 118/85   BP Location: Right arm   Patient Position: Sitting   Pulse: 100   Resp: 16   Temp: 97.6 °F (36.4 °C)   TempSrc: Oral   SpO2: 99%   Weight: 54.4 kg (120 lb)   Height: 157.5 cm (62\")       Physical Exam  General: Awake, alert, no acute distress.  HEENT: Conjunctiva normal.  Neck: Trachea midline.  Cardiac: Heart regular rate, rhythm, no murmurs, rubs, or gallops  Lungs: Lungs are clear to auscultation, there is no wheezing, rhonchi, " "or rales. There is no use of accessory muscles.  Chest wall: There is no tenderness to palpation over the chest wall or over ribs  Abdomen: Abdomen is soft, nontender, nondistended. There are no firm or pulsatile masses, no rebound rigidity or guarding.   Musculoskeletal: No deformity.  Neuro: Alert and oriented x 4.  Dermatology: Skin is warm and dry  Psych: Mentation is grossly normal, cognition is grossly normal. Affect is appropriate.        DIAGNOSTIC RESULTS   LABS:    I have reviewed and interpreted all of the currently available lab results from this visit (if applicable):  Results for orders placed or performed during the hospital encounter of 02/08/21   POCT Pregnancy, Urine    Specimen: Urine   Result Value Ref Range    HCG, Urine, QL Negative Negative    Lot Number [id number]     Internal Positive Control Positive     Internal Negative Control Negative    POC Surgery Labs    Specimen: Blood   Result Value Ref Range    Ionized Calcium 1.27 1.20 - 1.32 mmol/L    POC Potassium 3.8 3.5 - 4.9 mmol/L    Sodium 142 138 - 146 mmol/L    Total CO2 26 24 - 29 mmol/L    Hemoglobin 15.3 12.0 - 17.0 g/dL    Hematocrit 45 38 - 51 %    pCO2, Arterial 42.5 35 - 45 mm Hg    pO2, Arterial 32 (L) 80 - 105 mmHg    Base Excess 0.0000 -5 - 5 mmol/L    O2 Saturation, Arterial 60 (L) 95 - 98 %    pH, Arterial 7.38 7.35 - 7.6 pH units    HCO3, Arterial 25.0 22 - 26 mmol/L    Glucose 83 70 - 130 mg/dL   ECG 12 Lead   Result Value Ref Range    QT Interval 366 ms    QTC Interval 437 ms        All other labs were within normal range or not returned as of this dictation.      EMERGENCY DEPARTMENT COURSE and DIFFERENTIAL DIAGNOSIS/MDM:   Vitals:    Vitals:    06/13/21 0101   BP: 118/85   BP Location: Right arm   Patient Position: Sitting   Pulse: 100   Resp: 16   Temp: 97.6 °F (36.4 °C)   TempSrc: Oral   SpO2: 99%   Weight: 54.4 kg (120 lb)   Height: 157.5 cm (62\")       ED Course as of Jun 13 0543   Sun Jun 13, 2021   0320 Patient is " feeling much better at this time.  She requested no additional testing be done and would like to go home.  She is clinically sober.  She request that we contact her boyfriend to come pick her up.    [NS]      ED Course User Index  [NS] Reilly Parks MD       Patient presentation is consistent with acute alcohol intoxication that is otherwise uncomplicated.  She developed clinical sobriety during the course of her emergency department stay and there is no evidence of trauma, additional coingestions, or acute abdominal emergency.  Her abdominal exam is completely benign.  I reviewed home care with her as well as the importance of hydration and she expressed understanding.  She will follow closely with her primary care provider.    I had a discussion with the patient/family regarding diagnosis, diagnostic results, treatment plan, and medications.  The patient/family indicated understanding of these instructions.  I spent adequate time at the bedside preceding discharge necessary to personally discuss the aftercare instructions, giving patient education, providing explanations of the results of our evaluations/findings, and my decision making to assure that the patient/family understand the plan of care.  Time was allotted to answer questions at that time and throughout the ED course.  Emphasis was placed on timely follow-up after discharge.  I also discussed the potential for the development of an acute emergent condition requiring further evaluation, admission, or even surgical intervention. I discussed that we found nothing during the visit today indicating the need for further workup, admission, or the presence of an unstable medical condition.  I encouraged the patient to return to the emergency department immediately for ANY concerns, worsening, new complaints, or if symptoms persist and unable to seek follow-up in a timely fashion.  The patient/family expressed understanding and agreement with this plan.   The patient will follow-up with their PCP in 1-2 days for reevaluation.           FINAL IMPRESSION      1. Alcoholic intoxication without complication (CMS/HCC)    2. Nausea and vomiting, intractability of vomiting not specified, unspecified vomiting type          DISPOSITION/PLAN     ED Disposition     ED Disposition Condition Comment    Discharge Stable           PATIENT REFERRED TO:  Rizwana Odonnell, APRN  [address]  [address]  [facility] [address]  [phone]    Schedule an appointment as soon as possible for a visit in 2 days      [facility] Emergency Department  [address] [facility] [address] [phone]    If symptoms worsen      DISCHARGE MEDICATIONS:     Medication List      CONTINUE taking these medications    hydrOXYzine 25 MG tablet  Commonly known as: ATARAX  Take 1 tablet by mouth Every 6 (Six) Hours As Needed for Itching.     nystatin 376216 UNIT/GM cream  Commonly known as: MYCOSTATIN  Use 2 X daily on affected areas then continue 2 weeeks after rash resolves.     predniSONE 20 MG tablet  Commonly known as: DELTASONE  3 tabs for 2 days, 2 tabs for 2 days, 1 tab for 2 days     promethazine-dextromethorphan 6.25-15 MG/5ML syrup  Commonly known as: PROMETHAZINE-DM  Take 5 mL by mouth 4 (Four) Times a Day As Needed for Cough.                Comment: Please note this report has been produced using speech recognition software.      Reilly Parks MD  Attending Emergency Physician               Reilly Parks MD  06/13/21 2655

## 2021-09-10 ENCOUNTER — APPOINTMENT (OUTPATIENT)
Dept: MRI IMAGING | Facility: HOSPITAL | Age: 21
End: 2021-09-10

## 2021-09-10 ENCOUNTER — HOSPITAL ENCOUNTER (EMERGENCY)
Facility: HOSPITAL | Age: 21
Discharge: HOME | End: 2021-09-10
Attending: EMERGENCY MEDICINE | Admitting: EMERGENCY MEDICINE

## 2021-09-10 VITALS
SYSTOLIC BLOOD PRESSURE: 123 MMHG | BODY MASS INDEX: 22.08 KG/M2 | WEIGHT: 120 LBS | TEMPERATURE: 98.7 F | RESPIRATION RATE: 16 BRPM | OXYGEN SATURATION: 100 % | HEIGHT: 62 IN | DIASTOLIC BLOOD PRESSURE: 90 MMHG | HEART RATE: 81 BPM

## 2021-09-10 DIAGNOSIS — R42 VERTIGO: ICD-10-CM

## 2021-09-10 DIAGNOSIS — R42 DIZZINESS: Primary | ICD-10-CM

## 2021-09-10 LAB
ALBUMIN SERPL-MCNC: 4.7 G/DL (ref 3.5–5.2)
ALBUMIN/GLOB SERPL: 1.6 G/DL
ALP SERPL-CCNC: 78 U/L (ref 39–117)
ALT SERPL W P-5'-P-CCNC: 10 U/L (ref 1–33)
ANION GAP SERPL CALCULATED.3IONS-SCNC: 11 MMOL/L (ref 5–15)
AST SERPL-CCNC: 18 U/L (ref 1–32)
B-HCG UR QL: NEGATIVE
BACTERIA UR QL AUTO: NORMAL /HPF
BASOPHILS # BLD AUTO: 0.04 10*3/MM3 (ref 0–0.2)
BASOPHILS NFR BLD AUTO: 0.8 % (ref 0–1.5)
BILIRUB SERPL-MCNC: 0.3 MG/DL (ref 0–1.2)
BILIRUB UR QL STRIP: NEGATIVE
BUN SERPL-MCNC: 6 MG/DL (ref 6–20)
BUN/CREAT SERPL: 9.1 (ref 7–25)
CALCIUM SPEC-SCNC: 9.8 MG/DL (ref 8.6–10.5)
CHLORIDE SERPL-SCNC: 105 MMOL/L (ref 98–107)
CLARITY UR: CLEAR
CO2 SERPL-SCNC: 24 MMOL/L (ref 22–29)
COLOR UR: YELLOW
CREAT SERPL-MCNC: 0.66 MG/DL (ref 0.57–1)
DEPRECATED RDW RBC AUTO: 41.2 FL (ref 37–54)
EOSINOPHIL # BLD AUTO: 0.11 10*3/MM3 (ref 0–0.4)
EOSINOPHIL NFR BLD AUTO: 2.1 % (ref 0.3–6.2)
ERYTHROCYTE [DISTWIDTH] IN BLOOD BY AUTOMATED COUNT: 12.4 % (ref 12.3–15.4)
GFR SERPL CREATININE-BSD FRML MDRD: 113 ML/MIN/1.73
GLOBULIN UR ELPH-MCNC: 3 GM/DL
GLUCOSE SERPL-MCNC: 83 MG/DL (ref 65–99)
GLUCOSE UR STRIP-MCNC: NEGATIVE MG/DL
HCT VFR BLD AUTO: 43.6 % (ref 34–46.6)
HGB BLD-MCNC: 14.5 G/DL (ref 12–15.9)
HGB UR QL STRIP.AUTO: ABNORMAL
HOLD SPECIMEN: NORMAL
HYALINE CASTS UR QL AUTO: NORMAL /LPF
IMM GRANULOCYTES # BLD AUTO: 0.01 10*3/MM3 (ref 0–0.05)
IMM GRANULOCYTES NFR BLD AUTO: 0.2 % (ref 0–0.5)
INTERNAL NEGATIVE CONTROL: NEGATIVE
INTERNAL POSITIVE CONTROL: POSITIVE
KETONES UR QL STRIP: NEGATIVE
LEUKOCYTE ESTERASE UR QL STRIP.AUTO: ABNORMAL
LYMPHOCYTES # BLD AUTO: 2.17 10*3/MM3 (ref 0.7–3.1)
LYMPHOCYTES NFR BLD AUTO: 42.1 % (ref 19.6–45.3)
Lab: NORMAL
MCH RBC QN AUTO: 30.4 PG (ref 26.6–33)
MCHC RBC AUTO-ENTMCNC: 33.3 G/DL (ref 31.5–35.7)
MCV RBC AUTO: 91.4 FL (ref 79–97)
MONOCYTES # BLD AUTO: 0.58 10*3/MM3 (ref 0.1–0.9)
MONOCYTES NFR BLD AUTO: 11.3 % (ref 5–12)
NEUTROPHILS NFR BLD AUTO: 2.24 10*3/MM3 (ref 1.7–7)
NEUTROPHILS NFR BLD AUTO: 43.5 % (ref 42.7–76)
NITRITE UR QL STRIP: NEGATIVE
NRBC BLD AUTO-RTO: 0 /100 WBC (ref 0–0.2)
PH UR STRIP.AUTO: 7.5 [PH] (ref 5–8)
PLATELET # BLD AUTO: 282 10*3/MM3 (ref 140–450)
PMV BLD AUTO: 10.6 FL (ref 6–12)
POTASSIUM SERPL-SCNC: 3.9 MMOL/L (ref 3.5–5.2)
PROT SERPL-MCNC: 7.7 G/DL (ref 6–8.5)
PROT UR QL STRIP: NEGATIVE
RBC # BLD AUTO: 4.77 10*6/MM3 (ref 3.77–5.28)
RBC # UR: NORMAL /HPF
REF LAB TEST METHOD: NORMAL
SODIUM SERPL-SCNC: 140 MMOL/L (ref 136–145)
SP GR UR STRIP: <=1.005 (ref 1–1.03)
SQUAMOUS #/AREA URNS HPF: NORMAL /HPF
UROBILINOGEN UR QL STRIP: ABNORMAL
WBC # BLD AUTO: 5.15 10*3/MM3 (ref 3.4–10.8)
WBC UR QL AUTO: NORMAL /HPF
WHOLE BLOOD HOLD SPECIMEN: NORMAL

## 2021-09-10 PROCEDURE — 70551 MRI BRAIN STEM W/O DYE: CPT

## 2021-09-10 PROCEDURE — 85025 COMPLETE CBC W/AUTO DIFF WBC: CPT | Performed by: PHYSICIAN ASSISTANT

## 2021-09-10 PROCEDURE — 81025 URINE PREGNANCY TEST: CPT | Performed by: PHYSICIAN ASSISTANT

## 2021-09-10 PROCEDURE — 93005 ELECTROCARDIOGRAM TRACING: CPT | Performed by: PHYSICIAN ASSISTANT

## 2021-09-10 PROCEDURE — 80053 COMPREHEN METABOLIC PANEL: CPT | Performed by: PHYSICIAN ASSISTANT

## 2021-09-10 PROCEDURE — 99284 EMERGENCY DEPT VISIT MOD MDM: CPT

## 2021-09-10 PROCEDURE — 81001 URINALYSIS AUTO W/SCOPE: CPT | Performed by: PHYSICIAN ASSISTANT

## 2021-09-10 RX ORDER — MECLIZINE HYDROCHLORIDE 25 MG/1
25 TABLET ORAL 3 TIMES DAILY PRN
Qty: 21 TABLET | Refills: 0 | OUTPATIENT
Start: 2021-09-10 | End: 2021-11-10

## 2021-09-10 RX ADMIN — SODIUM CHLORIDE 1000 ML: 9 INJECTION, SOLUTION INTRAVENOUS at 17:06

## 2021-09-10 NOTE — ED PROVIDER NOTES
"Subjective   Ms. Estrada is a pleasant 20-year-old female who presents to the emergency department with complaints of dizziness and some shortness of breath that has been ongoing for the past 2 months but that has gotten worse over the past week or so.  The patient states that she feels a \"pressure\" sensation in the head.  She has had some recent diarrhea over the past 2 weeks.  No abdominal pain, nausea or vomiting.  No bloody stools.  The patient states that today, she felt like she might pass out briefly.  She denies any chest pain or palpitations.  No cough.  She states that she feels like she is not getting a full breath at times.  No pleuritic chest pain.  No fever or chills.  No known exposure to COVID-19.  The patient notes that she had an MRI back in 2018 that showed some \"white matter lesions\".  She states that she saw several doctors about that but no one could offer an explanation as to their significance.  She has not had any further MRIs since then.  The patient has a history of mild hypertension but is not on any medications.  She is a non-smoker.  No alcohol or drug use.  Last menstrual period is now.          Review of Systems   Constitutional: Negative for appetite change, chills, fever and unexpected weight change.   HENT: Negative for sore throat.    Respiratory: Positive for shortness of breath. Negative for cough.    Cardiovascular: Negative for chest pain, palpitations and leg swelling.   Gastrointestinal: Positive for diarrhea. Negative for abdominal pain, blood in stool, nausea and vomiting.   Genitourinary: Negative for decreased urine volume and dysuria.   Musculoskeletal: Negative for back pain.   Skin: Negative for pallor and rash.   Allergic/Immunologic: Negative for immunocompromised state.   Neurological: Positive for dizziness and light-headedness. Negative for speech difficulty, weakness and headaches.   Hematological: Negative.    Psychiatric/Behavioral: Negative.        Past " Medical History:   Diagnosis Date   • Hypertension    • Migraine        Allergies   Allergen Reactions   • Doxycycline GI Intolerance       Past Surgical History:   Procedure Laterality Date   • ADENOIDECTOMY     • EAR TUBES Bilateral    • INNER EAR SURGERY Right        Family History   Problem Relation Age of Onset   • Alcohol abuse Father    • Hypertension Maternal Grandmother    • Anemia Maternal Grandmother    • Cancer Maternal Grandmother    • Cancer Maternal Grandfather    • Parkinsonism Paternal Grandfather        Social History     Socioeconomic History   • Marital status: Single     Spouse name: Not on file   • Number of children: Not on file   • Years of education: Not on file   • Highest education level: Not on file   Tobacco Use   • Smoking status: Never Smoker   • Smokeless tobacco: Never Used   Vaping Use   • Vaping Use: Never used   Substance and Sexual Activity   • Alcohol use: Yes   • Drug use: No   • Sexual activity: Defer     Birth control/protection: Injection           Objective   Physical Exam  Constitutional:       General: She is not in acute distress.     Appearance: Normal appearance. She is not ill-appearing, toxic-appearing or diaphoretic.   HENT:      Head: Normocephalic.      Right Ear: Tympanic membrane normal.      Left Ear: Tympanic membrane normal.      Nose: Nose normal.      Mouth/Throat:      Mouth: Mucous membranes are moist.   Eyes:      General: No scleral icterus.     Conjunctiva/sclera: Conjunctivae normal.      Pupils: Pupils are equal, round, and reactive to light.   Cardiovascular:      Rate and Rhythm: Normal rate and regular rhythm.      Pulses: Normal pulses.      Heart sounds: Normal heart sounds. No murmur heard.     Pulmonary:      Effort: Pulmonary effort is normal. No respiratory distress.      Breath sounds: Normal breath sounds. No wheezing or rhonchi.   Chest:      Chest wall: No tenderness.   Abdominal:      General: Bowel sounds are normal.      Tenderness:  There is no abdominal tenderness.   Musculoskeletal:         General: No tenderness. Normal range of motion.      Cervical back: Normal range of motion and neck supple. No tenderness.      Right lower leg: No edema.      Left lower leg: No edema.   Skin:     General: Skin is warm and dry.      Coloration: Skin is not pale.      Findings: No rash.   Neurological:      General: No focal deficit present.      Mental Status: She is alert and oriented to person, place, and time.      Comments: Normal mentation.  Normal speech.  Normal facial symmetry.  Equal  bilaterally.  No drift.  Normal leg strength.   Psychiatric:         Mood and Affect: Mood normal.         Procedures           ED Course      The pt appears in no distress.  No reproducible dizziness on head motion.  No nystagmus.  Not orthostatic.  No concerning labs.  MRI brain is stable from prior with no concerning abnormalities.  I spoke with the pt about her reassuring workup today.  Will d/c home on Meclizine and have follow up.                                         MDM    Final diagnoses:   Dizziness   Vertigo       ED Disposition  ED Disposition     ED Disposition Condition Comment    Discharge Stable           Rizwana Odonnell R, APRN  [address] [address] [phone]      call on Monday for follow up if symptoms persist.         Medication List      No changes were made to your prescriptions during this visit.          Chay Balbuena, PA  09/10/21 4056

## 2021-09-10 NOTE — DISCHARGE INSTRUCTIONS
Rest.  Meclizine as prescribed for dizziness.  Follow up with your PCP on Monday if symptoms persist. Return to the ER if worse.

## 2021-09-20 ENCOUNTER — TELEPHONE (OUTPATIENT)
Dept: INTERNAL MEDICINE | Facility: CLINIC | Age: 21
End: 2021-09-20

## 2021-09-20 LAB
QT INTERVAL: 404 MS
QTC INTERVAL: 439 MS

## 2021-09-20 NOTE — TELEPHONE ENCOUNTER
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

## 2021-09-20 NOTE — TELEPHONE ENCOUNTER
Based on patient's medications she should be able to take over-the-counter NyQuil DayQuil for symptoms and Pepto or Imodium for her diarrhea.  If she needs a video visit I will be glad to do on with her.

## 2021-09-22 ENCOUNTER — NURSE TRIAGE (OUTPATIENT)
Dept: CALL CENTER | Facility: HOSPITAL | Age: 21
End: 2021-09-22

## 2021-09-22 ENCOUNTER — HOSPITAL ENCOUNTER (EMERGENCY)
Facility: HOSPITAL | Age: 21
Discharge: HOME | End: 2021-09-22
Attending: EMERGENCY MEDICINE | Admitting: EMERGENCY MEDICINE

## 2021-09-22 ENCOUNTER — APPOINTMENT (OUTPATIENT)
Dept: GENERAL RADIOLOGY | Facility: HOSPITAL | Age: 21
End: 2021-09-22

## 2021-09-22 VITALS
SYSTOLIC BLOOD PRESSURE: 125 MMHG | RESPIRATION RATE: 16 BRPM | WEIGHT: 120 LBS | TEMPERATURE: 98.2 F | DIASTOLIC BLOOD PRESSURE: 85 MMHG | OXYGEN SATURATION: 99 % | HEIGHT: 62 IN | BODY MASS INDEX: 22.08 KG/M2 | HEART RATE: 99 BPM

## 2021-09-22 DIAGNOSIS — U07.1 COVID-19: Primary | ICD-10-CM

## 2021-09-22 DIAGNOSIS — R91.8 RIGHT LOWER LOBE PULMONARY INFILTRATE: ICD-10-CM

## 2021-09-22 LAB — SARS-COV-2 RNA RESP QL NAA+PROBE: DETECTED

## 2021-09-22 PROCEDURE — 71045 X-RAY EXAM CHEST 1 VIEW: CPT

## 2021-09-22 PROCEDURE — 63710000001 PREDNISONE PER 1 MG: Performed by: EMERGENCY MEDICINE

## 2021-09-22 PROCEDURE — 99283 EMERGENCY DEPT VISIT LOW MDM: CPT

## 2021-09-22 PROCEDURE — U0003 INFECTIOUS AGENT DETECTION BY NUCLEIC ACID (DNA OR RNA); SEVERE ACUTE RESPIRATORY SYNDROME CORONAVIRUS 2 (SARS-COV-2) (CORONAVIRUS DISEASE [COVID-19]), AMPLIFIED PROBE TECHNIQUE, MAKING USE OF HIGH THROUGHPUT TECHNOLOGIES AS DESCRIBED BY CMS-2020-01-R: HCPCS | Performed by: EMERGENCY MEDICINE

## 2021-09-22 RX ORDER — PREDNISONE 20 MG/1
60 TABLET ORAL ONCE
Status: COMPLETED | OUTPATIENT
Start: 2021-09-22 | End: 2021-09-22

## 2021-09-22 RX ORDER — AZITHROMYCIN 250 MG/1
500 TABLET, FILM COATED ORAL ONCE
Status: COMPLETED | OUTPATIENT
Start: 2021-09-22 | End: 2021-09-22

## 2021-09-22 RX ORDER — PREDNISONE 20 MG/1
20 TABLET ORAL DAILY
Qty: 5 TABLET | Refills: 0 | OUTPATIENT
Start: 2021-09-22 | End: 2021-11-10

## 2021-09-22 RX ORDER — AZITHROMYCIN 250 MG/1
TABLET, FILM COATED ORAL
Qty: 6 TABLET | Refills: 0 | OUTPATIENT
Start: 2021-09-22 | End: 2021-11-10

## 2021-09-22 RX ORDER — ALBUTEROL SULFATE 90 UG/1
2 AEROSOL, METERED RESPIRATORY (INHALATION) EVERY 4 HOURS PRN
Qty: 18 G | Refills: 0 | OUTPATIENT
Start: 2021-09-22 | End: 2022-11-07

## 2021-09-22 RX ADMIN — PREDNISONE 60 MG: 20 TABLET ORAL at 21:54

## 2021-09-22 RX ADMIN — AZITHROMYCIN MONOHYDRATE 500 MG: 250 TABLET ORAL at 21:54

## 2021-09-22 NOTE — TELEPHONE ENCOUNTER
"Reviewed guideline with caller, advises she go to ED for evaluation of increasing shortness of breath. Caller agrees to follow care advice.     Reason for Disposition  • MILD difficulty breathing (e.g., minimal/no SOB at rest, SOB with walking, pulse <100)    Additional Information  • Negative: SEVERE difficulty breathing (e.g., struggling for each breath, speaks in single words)  • Negative: Difficult to awaken or acting confused (e.g., disoriented, slurred speech)  • Negative: Bluish (or gray) lips or face now  • Negative: Shock suspected (e.g., cold/pale/clammy skin, too weak to stand, low BP, rapid pulse)  • Negative: Sounds like a life-threatening emergency to the triager  • Negative: [1] COVID-19 exposure AND [2] no symptoms  • Negative: COVID-19 vaccine reaction suspected (e.g., fever, headache, muscle aches) occurring 1 to 3 days after getting vaccine  • Negative: COVID-19 vaccine, questions about  • Negative: [1] Lives with someone known to have influenza (flu test positive) AND [2] flu-like symptoms (e.g., cough, runny nose, sore throat, SOB; with or without fever)  • Negative: [1] Adult with possible COVID-19 symptoms AND [2] triager concerned about severity of symptoms or other causes  • Negative: COVID-19 and breastfeeding, questions about  • Negative: SEVERE or constant chest pain or pressure (Exception: mild central chest pain, present only when coughing)  • Negative: MODERATE difficulty breathing (e.g., speaks in phrases, SOB even at rest, pulse 100-120)  • Negative: [1] Headache AND [2] stiff neck (can't touch chin to chest)    Answer Assessment - Initial Assessment Questions  1. COVID-19 DIAGNOSIS: \"Who made your Coronavirus (COVID-19) diagnosis?\" \"Was it confirmed by a positive lab test?\" If not diagnosed by a HCP, ask \"Are there lots of cases (community spread) where you live?\" (See public health department website, if unsure)      Home test positive on Sunday   2. COVID-19 EXPOSURE: \"Was there " "any known exposure to COVID before the symptoms began?\" [facility] Definition of close contact: within 6 feet (2 meters) for a total of 15 minutes or more over a 24-hour period.       yes  3. ONSET: \"When did the COVID-19 symptoms start?\"       Sunday  4. WORST SYMPTOM: \"What is your worst symptom?\" (e.g., cough, fever, shortness of breath, muscle aches)      Shortness of breath  5. COUGH: \"Do you have a cough?\" If Yes, ask: \"How bad is the cough?\"        Yes, mild  6. FEVER: \"Do you have a fever?\" If Yes, ask: \"What is your temperature, how was it measured, and when did it start?\"      no  7. RESPIRATORY STATUS: \"Describe your breathing?\" (e.g., shortness of breath, wheezing, unable to speak)       Shortness of breath   8. BETTER-SAME-WORSE: \"Are you getting better, staying the same or getting worse compared to yesterday?\"  If getting worse, ask, \"In what way?\"      Worse due to shortness of breath   9. HIGH RISK DISEASE: \"Do you have any chronic medical problems?\" (e.g., asthma, heart or lung disease, weak immune system, obesity, etc.)      Allergy induced asthma   10. PREGNANCY: \"Is there any chance you are pregnant?\" \"When was your last menstrual period?\"        no  11. OTHER SYMPTOMS: \"Do you have any other symptoms?\"  (e.g., chills, fatigue, headache, loss of smell or taste, muscle pain, sore throat; new loss of smell or taste especially support the diagnosis of COVID-19)        Sore throat    Protocols used: CORONAVIRUS (COVID-19) DIAGNOSED OR SUSPECTED-ADULT-AH      "

## 2021-09-23 NOTE — ED PROVIDER NOTES
Subjective   The patient presents to the emergency department with Covid symptoms with onset 4 days ago.  Patient reports that she developed initially some cough.  The patient denies any fever.  Since the onset of symptoms she has now developed some burning sensation in her chest as well as some increased cough and dyspnea on exertion.  Patient is not vaccinated.  No other acute symptoms or complaints.  Patient has a history of hypertension.      History provided by:  Patient      Review of Systems   Constitutional: Positive for fatigue. Negative for fever.   Respiratory: Positive for cough and shortness of breath.    Cardiovascular: Negative.    Gastrointestinal: Negative.    Musculoskeletal: Negative.    Skin: Negative.    Neurological: Negative.    Psychiatric/Behavioral: Negative.        Past Medical History:   Diagnosis Date   • Hypertension    • Migraine        Allergies   Allergen Reactions   • Doxycycline GI Intolerance       Past Surgical History:   Procedure Laterality Date   • ADENOIDECTOMY     • EAR TUBES Bilateral    • INNER EAR SURGERY Right        Family History   Problem Relation Age of Onset   • Alcohol abuse Father    • Hypertension Maternal Grandmother    • Anemia Maternal Grandmother    • Cancer Maternal Grandmother    • Cancer Maternal Grandfather    • Parkinsonism Paternal Grandfather        Social History     Socioeconomic History   • Marital status: Single     Spouse name: Not on file   • Number of children: Not on file   • Years of education: Not on file   • Highest education level: Not on file   Tobacco Use   • Smoking status: Never Smoker   • Smokeless tobacco: Never Used   Vaping Use   • Vaping Use: Never used   Substance and Sexual Activity   • Alcohol use: Yes   • Drug use: No   • Sexual activity: Defer     Birth control/protection: Injection           Objective   Physical Exam  Vitals and nursing note reviewed.   Constitutional:       General: She is not in acute distress.      Appearance: Normal appearance. She is normal weight. She is not ill-appearing or toxic-appearing.   HENT:      Head: Normocephalic.   Cardiovascular:      Rate and Rhythm: Normal rate and regular rhythm.   Pulmonary:      Effort: Pulmonary effort is normal. No respiratory distress.      Breath sounds: Normal breath sounds.   Musculoskeletal:         General: Normal range of motion.      Cervical back: Normal range of motion.   Skin:     General: Skin is warm.   Neurological:      General: No focal deficit present.      Mental Status: She is alert and oriented to person, place, and time.   Psychiatric:         Mood and Affect: Mood normal.         Behavior: Behavior normal.         Procedures           ED Course  ED Course as of Sep 22 2153   Wed Sep 22, 2021   2040 Personally reviewed single view the chest that shows a mild right lower lobe infiltrate.  See report radiology for details.   XR Chest 1 View [RS]   2114 COVID19(!!): Detected [RS]   2133 Patient has evidence of COVID-19 with a nonspecific right lower lobe infiltrate that could be concerning for a secondary bacterial infection as well.  Plan to discharge the patient home with symptomatic management as well as supportive care to follow-up with her doctor symptoms persist and return to the ER for any concerns. I had a discussion with the patient/family regarding diagnosis, diagnostic results, treatment plan, and medications.  The patient/family indicated understanding of these instructions.  I spent adequate time at the bedside proceeding discharge necessary to personally discuss the aftercare instructions, giving patient education, providing explanations of the results of our evaluations/findings, and my decision making to assure that the patient/family understand the plan of care.  Time was allotted to answer questions at that time and throughout the ED course.  Emphasis was placed on timely follow-up after discharge.  I also discussed the potential for  the development of an acute emergent condition requiring further evaluation, admission, or even surgical intervention. I discussed that we found nothing during the visit today indicating the need for further workup, admission, or the presence of an unstable medical condition.  I encouraged the patient to return to the emergency department immediately for ANY concerns, worsening, new complaints, or if symptoms persist and unable to seek follow-up in a timely fashion.  The patient/family expressed understanding and agreement with this plan.     [RS]      ED Course User Index  [RS] Delta Zhang MD                                           MDM  Number of Diagnoses or Management Options  COVID-19  Right lower lobe pulmonary infiltrate  Diagnosis management comments: Recent Results (from the past 24 hour(s))  -COVID-19,CEPHEID/MAYCOL,ZAIN IN-HOUSE(OR EMERGENT/ADD-ON),NP SWAB IN TRANSPORT MEDIA 3-4 HR TAT - Swab, Nasopharynx  Collection Time: 09/22/21  8:14 PM  Specimen: Nasopharynx; Swab       Result                      Value             Ref Range           COVID19                     Detected (C)      Not Detected*  Note: In addition to lab results from this visit, the labs listed above may include labs taken at another facility or during a different encounter within the last 24 hours. Please correlate lab times with ED admission and discharge times for further clarification of the services performed during this visit.    XR Chest 1 View   Final Result        1. New partial obscuration of the right hemidiaphragm suspicious for right basilar pneumonia. Left lung clear.        Signer Name: Maximiliano Dover MD     Signed: 9/22/2021 8:33 PM     Workstation Name: [id number]      [facility]     --------------------------------------               09/22/21 09/22/21 1943 1944     --------------------------------------   BP:          125/85                "    BP Location:    Left arm                  Patient Position:     Sitting                  Pulse:                       99       Resp:          16                     Temp:   98.2 °F (36.8 °C)             TempSrc:      Oral                    SpO2:          99%                    Weight: 54.4 kg (120 lb)              Height:  157.5 cm (62\")              --------------------------------------  Medications  predniSONE (DELTASONE) tablet 60 mg (has no administration in time range)  azithromycin (ZITHROMAX) tablet 500 mg (has no administration in time range)  ECG/EMG Results (last 24 hours)     ** No results found for the last 24 hours. **      No orders to display         Amount and/or Complexity of Data Reviewed  Clinical lab tests: reviewed  Tests in the radiology section of CPT®: reviewed  Independent visualization of images, tracings, or specimens: yes        Final diagnoses:   COVID-19   Right lower lobe pulmonary infiltrate       ED Disposition  ED Disposition     ED Disposition Condition Comment    Discharge Stable           Rizwana Odonnell, APRN  [address]  [address]  [place] [address]  [phone]    In 5 days  If not better/resolved.    [facility] Emergency Department  [address] [address] [phone]    As needed, If symptoms worsen or ANY concerns.         Medication List      New Prescriptions    albuterol sulfate  (90 Base) MCG/ACT inhaler  Commonly known as: PROVENTIL HFA;VENTOLIN HFA;PROAIR HFA  Inhale 2 puffs Every 4 (Four) Hours As Needed for Wheezing or Shortness of Air.     azithromycin 250 MG tablet  Commonly known as: Zithromax Z-Chintan  Take 2 tablets the first day, then 1 tablet daily for 4 days.     predniSONE 20 MG tablet  Commonly known as: DELTASONE  Take 1 tablet by mouth Daily.           Where to Get Your Medications      These medications were sent to [facility] #[id number] - " [place], [place] - [address] AT [facility] [address] - [phone]  - [phone] FX  [address], [facility] [address]    Phone: [phone]   · albuterol sulfate  (90 Base) MCG/ACT inhaler  · azithromycin 250 MG tablet  · predniSONE 20 MG tablet          Delta Zhang MD  09/22/21 1872

## 2021-09-24 ENCOUNTER — TELEMEDICINE (OUTPATIENT)
Dept: INTERNAL MEDICINE | Facility: CLINIC | Age: 21
End: 2021-09-24

## 2021-09-24 DIAGNOSIS — J12.82 PNEUMONIA DUE TO COVID-19 VIRUS: ICD-10-CM

## 2021-09-24 DIAGNOSIS — U07.1 COVID-19: Primary | ICD-10-CM

## 2021-09-24 DIAGNOSIS — U07.1 PNEUMONIA DUE TO COVID-19 VIRUS: ICD-10-CM

## 2021-09-24 PROCEDURE — 99441 PR PHYS/QHP TELEPHONE EVALUATION 5-10 MIN: CPT | Performed by: NURSE PRACTITIONER

## 2021-09-24 NOTE — PROGRESS NOTES
Chief Complaint  URI    Subjective          Oksana Ly Aguirre presents to [facility]  History of Present Illness  This was an audio and video enabled telemedicine encounter.    You have chosen to receive care through a telehealth visit.  Do you consent to use a video/audio connection for your medical care today? Yes    Patient is here today due to Covid infection    Was seen in the ED yesterday for initial cough no fever burning in chest with increasing cough and dyspnea on exertion.  She is not vaccinated.  Chest x-ray noting a right mild lower lobe infiltrate.  New partial obscuration of the right hemidiaphragm suspicious for right basilar pneumonia.  She is taking steroid antibiotic and albuterol. Her meds were late and she is just getting her meds. She is taking sudafed which is helping and tylenol prn for HA. NO FNVDR or swetas.  Mild SOB letting dog out and adl but improved with albuterol using 2x day.    Objective   Vital Signs: Unable to take vital signs  There were no vitals taken for this visit.    Physical Exam  Constitutional:       Appearance: Normal appearance.   Eyes:      General: No scleral icterus.        Right eye: No discharge.         Left eye: No discharge.   Pulmonary:      Effort: Pulmonary effort is normal.   Skin:     Coloration: Skin is not pale.   Neurological:      Mental Status: She is alert and oriented to person, place, and time.   Psychiatric:         Mood and Affect: Mood normal.         Behavior: Behavior normal.        Result Review :                 Assessment and Plan    Diagnoses and all orders for this visit:    1. COVID-19 (Primary)    2. Pneumonia due to COVID-19 virus        6-minute video visit I have asked the patient to please make sure she is using her albuterol every 4 hours around-the-clock for shortness of breath of her symptoms at all worsen at any time she is to let us know she verbalizes  understanding.        Follow Up   Return if symptoms worsen or fail to improve.  Patient was given instructions and counseling regarding her condition or for health maintenance advice. Please see specific information pulled into the AVS if appropriate.     RTC/call  If symptoms worsen  Meds MOA and SE's reviewed and pt v/u

## 2021-11-01 ENCOUNTER — OFFICE VISIT (OUTPATIENT)
Dept: INTERNAL MEDICINE | Facility: CLINIC | Age: 21
End: 2021-11-01

## 2021-11-01 ENCOUNTER — LAB (OUTPATIENT)
Dept: LAB | Facility: HOSPITAL | Age: 21
End: 2021-11-01

## 2021-11-01 VITALS
RESPIRATION RATE: 14 BRPM | HEART RATE: 88 BPM | TEMPERATURE: 98.2 F | BODY MASS INDEX: 23.19 KG/M2 | OXYGEN SATURATION: 98 % | WEIGHT: 126 LBS | HEIGHT: 62 IN | DIASTOLIC BLOOD PRESSURE: 84 MMHG | SYSTOLIC BLOOD PRESSURE: 124 MMHG

## 2021-11-01 DIAGNOSIS — R82.998 LEUKOCYTES IN URINE: Primary | ICD-10-CM

## 2021-11-01 DIAGNOSIS — R42 DIZZINESS: ICD-10-CM

## 2021-11-01 DIAGNOSIS — J12.82 PNEUMONIA DUE TO COVID-19 VIRUS: Primary | ICD-10-CM

## 2021-11-01 DIAGNOSIS — H69.83 ETD (EUSTACHIAN TUBE DYSFUNCTION), BILATERAL: ICD-10-CM

## 2021-11-01 DIAGNOSIS — R03.0 ELEVATED BLOOD PRESSURE READING: ICD-10-CM

## 2021-11-01 DIAGNOSIS — R31.9 HEMATURIA, UNSPECIFIED TYPE: ICD-10-CM

## 2021-11-01 DIAGNOSIS — U07.1 PNEUMONIA DUE TO COVID-19 VIRUS: Primary | ICD-10-CM

## 2021-11-01 LAB
ALBUMIN SERPL-MCNC: 4.8 G/DL (ref 3.5–5.2)
ALBUMIN/GLOB SERPL: 1.7 G/DL
ALP SERPL-CCNC: 78 U/L (ref 39–117)
ALT SERPL W P-5'-P-CCNC: 7 U/L (ref 1–33)
ANION GAP SERPL CALCULATED.3IONS-SCNC: 8 MMOL/L (ref 5–15)
AST SERPL-CCNC: 15 U/L (ref 1–32)
BACTERIA UR QL AUTO: ABNORMAL /HPF
BASOPHILS # BLD AUTO: 0.04 10*3/MM3 (ref 0–0.2)
BASOPHILS NFR BLD AUTO: 0.7 % (ref 0–1.5)
BILIRUB BLD-MCNC: NEGATIVE MG/DL
BILIRUB SERPL-MCNC: 0.3 MG/DL (ref 0–1.2)
BUN SERPL-MCNC: 9 MG/DL (ref 6–20)
BUN/CREAT SERPL: 13 (ref 7–25)
CALCIUM SPEC-SCNC: 9.4 MG/DL (ref 8.6–10.5)
CHLORIDE SERPL-SCNC: 106 MMOL/L (ref 98–107)
CLARITY, POC: ABNORMAL
CO2 SERPL-SCNC: 26 MMOL/L (ref 22–29)
COLOR UR: ABNORMAL
CREAT SERPL-MCNC: 0.69 MG/DL (ref 0.57–1)
DEPRECATED RDW RBC AUTO: 43.7 FL (ref 37–54)
EOSINOPHIL # BLD AUTO: 0.09 10*3/MM3 (ref 0–0.4)
EOSINOPHIL NFR BLD AUTO: 1.6 % (ref 0.3–6.2)
ERYTHROCYTE [DISTWIDTH] IN BLOOD BY AUTOMATED COUNT: 12.6 % (ref 12.3–15.4)
EXPIRATION DATE: ABNORMAL
GFR SERPL CREATININE-BSD FRML MDRD: 106 ML/MIN/1.73
GLOBULIN UR ELPH-MCNC: 2.8 GM/DL
GLUCOSE SERPL-MCNC: 82 MG/DL (ref 65–99)
GLUCOSE UR STRIP-MCNC: NEGATIVE MG/DL
HCT VFR BLD AUTO: 40.8 % (ref 34–46.6)
HGB BLD-MCNC: 13 G/DL (ref 12–15.9)
HYALINE CASTS UR QL AUTO: ABNORMAL /LPF
IMM GRANULOCYTES # BLD AUTO: 0.01 10*3/MM3 (ref 0–0.05)
IMM GRANULOCYTES NFR BLD AUTO: 0.2 % (ref 0–0.5)
KETONES UR QL: NEGATIVE
LEUKOCYTE EST, POC: ABNORMAL
LYMPHOCYTES # BLD AUTO: 2.41 10*3/MM3 (ref 0.7–3.1)
LYMPHOCYTES NFR BLD AUTO: 42.1 % (ref 19.6–45.3)
Lab: ABNORMAL
MCH RBC QN AUTO: 29.9 PG (ref 26.6–33)
MCHC RBC AUTO-ENTMCNC: 31.9 G/DL (ref 31.5–35.7)
MCV RBC AUTO: 93.8 FL (ref 79–97)
MONOCYTES # BLD AUTO: 0.63 10*3/MM3 (ref 0.1–0.9)
MONOCYTES NFR BLD AUTO: 11 % (ref 5–12)
NEUTROPHILS NFR BLD AUTO: 2.54 10*3/MM3 (ref 1.7–7)
NEUTROPHILS NFR BLD AUTO: 44.4 % (ref 42.7–76)
NITRITE UR-MCNC: NEGATIVE MG/ML
NRBC BLD AUTO-RTO: 0 /100 WBC (ref 0–0.2)
PH UR: 5 [PH] (ref 5–8)
PLATELET # BLD AUTO: 276 10*3/MM3 (ref 140–450)
PMV BLD AUTO: 10.7 FL (ref 6–12)
POTASSIUM SERPL-SCNC: 3.9 MMOL/L (ref 3.5–5.2)
PROT SERPL-MCNC: 7.6 G/DL (ref 6–8.5)
PROT UR STRIP-MCNC: NEGATIVE MG/DL
RBC # BLD AUTO: 4.35 10*6/MM3 (ref 3.77–5.28)
RBC # UR STRIP: ABNORMAL /UL
RBC # UR: ABNORMAL /HPF
REF LAB TEST METHOD: ABNORMAL
SODIUM SERPL-SCNC: 140 MMOL/L (ref 136–145)
SP GR UR: 1.01 (ref 1–1.03)
SQUAMOUS #/AREA URNS HPF: ABNORMAL /HPF
TSH SERPL DL<=0.05 MIU/L-ACNC: 1.4 UIU/ML (ref 0.27–4.2)
UROBILINOGEN UR QL: NORMAL
WBC # BLD AUTO: 5.72 10*3/MM3 (ref 3.4–10.8)
WBC UR QL AUTO: ABNORMAL /HPF

## 2021-11-01 PROCEDURE — 80053 COMPREHEN METABOLIC PANEL: CPT | Performed by: NURSE PRACTITIONER

## 2021-11-01 PROCEDURE — 87086 URINE CULTURE/COLONY COUNT: CPT

## 2021-11-01 PROCEDURE — 81003 URINALYSIS AUTO W/O SCOPE: CPT | Performed by: NURSE PRACTITIONER

## 2021-11-01 PROCEDURE — 84443 ASSAY THYROID STIM HORMONE: CPT | Performed by: NURSE PRACTITIONER

## 2021-11-01 PROCEDURE — 81015 MICROSCOPIC EXAM OF URINE: CPT

## 2021-11-01 PROCEDURE — 85025 COMPLETE CBC W/AUTO DIFF WBC: CPT | Performed by: NURSE PRACTITIONER

## 2021-11-01 PROCEDURE — 99214 OFFICE O/P EST MOD 30 MIN: CPT | Performed by: NURSE PRACTITIONER

## 2021-11-01 RX ORDER — FLUTICASONE PROPIONATE 50 MCG
2 SPRAY, SUSPENSION (ML) NASAL DAILY
Qty: 18 G | Refills: 11 | OUTPATIENT
Start: 2021-11-01 | End: 2022-11-07

## 2021-11-01 NOTE — PROGRESS NOTES
"Chief Complaint  Hypertension and Dizziness    Subjective          Oksana Aguirre presents to [facility]  History of Present Illness  Patient comes in today with concern for hypertension.  She was seen in our walk-in clinic earlier today for allergic reaction noted on her eyelids it was noted to have elevated blood pressure.  They advised that she should be seen in our office for evaluation.  She has not had elevated blood pressure in the past.  She does feel mildly dizzy.  She did have Covid pneumonia 9/21 right lower lobe.  She is due to have recheck of her pneumonia.    She had MRI of her brain 2021-09-10 when diagnosed with Covid and noted to have maxillary sinusitis.  She was treated at that time for pneumonia however which would have treated sinusitis.                 Objective   Vital Signs:   /84   Pulse 88   Temp 98.2 °F (36.8 °C) (Infrared)   Resp 14   Ht 157.5 cm (62\")   Wt 57.2 kg (126 lb)   SpO2 98%   BMI 23.05 kg/m²     Physical Exam  Vitals and nursing note reviewed.   Constitutional:       General: She is not in acute distress.     Appearance: Normal appearance. She is well-developed. She is not ill-appearing.   HENT:      Head: Normocephalic and atraumatic.      Right Ear: External ear normal.      Left Ear: External ear normal.      Ears:      Comments: Trace fluid bilateral TMs bilateral otosclerosis is noted.     Nose: Nose normal.      Mouth/Throat:      Comments: Clear postnasal drainage  Eyes:      Comments: Mild erythema due to recent allergic reaction related to possible makeup-mild   Neck:      Thyroid: No thyromegaly.   Cardiovascular:      Rate and Rhythm: Normal rate and regular rhythm.   Pulmonary:      Effort: Pulmonary effort is normal.      Breath sounds: Normal breath sounds.   Abdominal:      General: Bowel sounds are normal. There is no distension.      Palpations: Abdomen is soft.      Tenderness: There is no " abdominal tenderness.   Lymphadenopathy:      Cervical: No cervical adenopathy.   Skin:     General: Skin is warm and dry.      Capillary Refill: Capillary refill takes 2 to 3 seconds.   Neurological:      Mental Status: She is alert and oriented to person, place, and time.      Cranial Nerves: No cranial nerve deficit.      Gait: Gait normal.      Deep Tendon Reflexes: Reflexes normal.   Psychiatric:         Mood and Affect: Mood normal.         Behavior: Behavior normal.           Result Review :                 Assessment and Plan    Diagnoses and all orders for this visit:    1. Pneumonia due to COVID-19 virus (Primary)  -     XR Chest PA & Lateral; Future  -     CBC & Differential; Future  -     CBC & Differential    2. Elevated blood pressure reading  -     Comprehensive Metabolic Panel; Future  -     TSH; Future  -     POC Urinalysis Dipstick, Automated; Future  -     Comprehensive Metabolic Panel  -     TSH  -     POC Urinalysis Dipstick, Automated    3. ETD (Eustachian tube dysfunction), bilateral    4. Dizziness  -     fluticasone (Flonase) 50 MCG/ACT nasal spray; 2 sprays into the nostril(s) as directed by provider Daily.  Dispense: 18 g; Refill: 11            Concern dizziness may be related to your blood pressure versus sinus/etd  Blood pressure may incidentally have been elevated due to allergic reaction.  I will have her monitor for 2 weeks return to clinic for physical exam recheck of her blood pressure readings repeat chest x-ray to show resolution of Covid pneumonia recheck dizziness is at that time.  Follow Up   Return in about 2 weeks (around 11/15/2021) for fasting, Annual.  Patient was given instructions and counseling regarding her condition or for health maintenance advice. Please see specific information pulled into the AVS if appropriate.     RTC/call  If symptoms worsen  Meds MOA and SE's reviewed and pt v/u

## 2021-11-01 NOTE — PATIENT INSTRUCTIONS
"https://www.nhlbi.nih.gov/files/docs/public/heart/dash_brief.pdf\">   DASH Eating Plan  DASH stands for Dietary Approaches to Stop Hypertension. The DASH eating plan is a healthy eating plan that has been shown to:  · Reduce high blood pressure (hypertension).  · Reduce your risk for type 2 diabetes, heart disease, and stroke.  · Help with weight loss.  What are tips for following this plan?  Reading food labels  · Check food labels for the amount of salt (sodium) per serving. Choose foods with less than 5 percent of the Daily Value of sodium. Generally, foods with less than 300 milligrams (mg) of sodium per serving fit into this eating plan.  · To find whole grains, look for the word \"whole\" as the first word in the ingredient list.  Shopping  · Buy products labeled as \"low-sodium\" or \"no salt added.\"  · Buy fresh foods. Avoid canned foods and pre-made or frozen meals.  Cooking  · Avoid adding salt when cooking. Use salt-free seasonings or herbs instead of table salt or sea salt. Check with your health care provider or pharmacist before using salt substitutes.  · Do not bermeo foods. Cook foods using healthy methods such as baking, boiling, grilling, roasting, and broiling instead.  · Cook with heart-healthy oils, such as olive, canola, avocado, soybean, or sunflower oil.  Meal planning    · Eat a balanced diet that includes:  ? 4 or more servings of fruits and 4 or more servings of vegetables each day. Try to fill one-half of your plate with fruits and vegetables.  ? 6-8 servings of whole grains each day.  ? Less than 6 oz (170 g) of lean meat, poultry, or fish each day. A 3-oz (85-g) serving of meat is about the same size as a deck of cards. One egg equals 1 oz (28 g).  ? 2-3 servings of low-fat dairy each day. One serving is 1 cup (237 mL).  ? 1 serving of nuts, seeds, or beans 5 times each week.  ? 2-3 servings of heart-healthy fats. Healthy fats called omega-3 fatty acids are found in foods such as walnuts, " flaxseeds, fortified milks, and eggs. These fats are also found in cold-water fish, such as sardines, salmon, and mackerel.  · Limit how much you eat of:  ? Canned or prepackaged foods.  ? Food that is high in trans fat, such as some fried foods.  ? Food that is high in saturated fat, such as fatty meat.  ? Desserts and other sweets, sugary drinks, and other foods with added sugar.  ? Full-fat dairy products.  · Do not salt foods before eating.  · Do not eat more than 4 egg yolks a week.  · Try to eat at least 2 vegetarian meals a week.  · Eat more home-cooked food and less restaurant, buffet, and fast food.    Lifestyle  · When eating at a restaurant, ask that your food be prepared with less salt or no salt, if possible.  · If you drink alcohol:  ? Limit how much you use to:  § 0-1 drink a day for women who are not pregnant.  § 0-2 drinks a day for men.  ? Be aware of how much alcohol is in your drink. In the [place] one drink equals one 12 oz bottle of beer (355 mL), one 5 oz glass of wine (148 mL), or one 1½ oz glass of hard liquor (44 mL).  General information  · Avoid eating more than 2,300 mg of salt a day. If you have hypertension, you may need to reduce your sodium intake to 1,500 mg a day.  · Work with your health care provider to maintain a healthy body weight or to lose weight. Ask what an ideal weight is for you.  · Get at least 30 minutes of exercise that causes your heart to beat faster (aerobic exercise) most days of the week. Activities may include walking, swimming, or biking.  · Work with your health care provider or dietitian to adjust your eating plan to your individual calorie needs.  What foods should I eat?  Fruits  All fresh, dried, or frozen fruit. Canned fruit in natural juice (without added sugar).  Vegetables  Fresh or frozen vegetables (raw, steamed, roasted, or grilled). Low-sodium or reduced-sodium tomato and vegetable juice. Low-sodium or reduced-sodium tomato sauce and tomato paste.  Low-sodium or reduced-sodium canned vegetables.  Grains  Whole-grain or whole-wheat bread. Whole-grain or whole-wheat pasta. Brown rice. Oatmeal. Quinoa. Bulgur. Whole-grain and low-sodium cereals. Ruth bread. Low-fat, low-sodium crackers. Whole-wheat flour tortillas.  Meats and other proteins  Skinless chicken or turkey. Ground chicken or turkey. Pork with fat trimmed off. Fish and seafood. Egg whites. Dried beans, peas, or lentils. Unsalted nuts, nut butters, and seeds. Unsalted canned beans. Lean cuts of beef with fat trimmed off. Low-sodium, lean precooked or cured meat, such as sausages or meat loaves.  Dairy  Low-fat (1%) or fat-free (skim) milk. Reduced-fat, low-fat, or fat-free cheeses. Nonfat, low-sodium ricotta or cottage cheese. Low-fat or nonfat yogurt. Low-fat, low-sodium cheese.  Fats and oils  Soft margarine without trans fats. Vegetable oil. Reduced-fat, low-fat, or light mayonnaise and salad dressings (reduced-sodium). Canola, safflower, olive, avocado, soybean, and sunflower oils. Avocado.  Seasonings and condiments  Herbs. Spices. Seasoning mixes without salt.  Other foods  Unsalted popcorn and pretzels. Fat-free sweets.  The items listed above may not be a complete list of foods and beverages you can eat. Contact a dietitian for more information.  What foods should I avoid?  Fruits  Canned fruit in a light or heavy syrup. Fried fruit. Fruit in cream or butter sauce.  Vegetables  Creamed or fried vegetables. Vegetables in a cheese sauce. Regular canned vegetables (not low-sodium or reduced-sodium). Regular canned tomato sauce and paste (not low-sodium or reduced-sodium). Regular tomato and vegetable juice (not low-sodium or reduced-sodium). Pickles. Olives.  Grains  Baked goods made with fat, such as croissants, muffins, or some breads. Dry pasta or rice meal packs.  Meats and other proteins  Fatty cuts of meat. Ribs. Fried meat. Velazquez. Bologna, salami, and other precooked or cured meats, such as  sausages or meat loaves. Fat from the back of a pig (fatback). Bratwurst. Salted nuts and seeds. Canned beans with added salt. Canned or smoked fish. Whole eggs or egg yolks. Chicken or turkey with skin.  Dairy  Whole or 2% milk, cream, and half-and-half. Whole or full-fat cream cheese. Whole-fat or sweetened yogurt. Full-fat cheese. Nondairy creamers. Whipped toppings. Processed cheese and cheese spreads.  Fats and oils  Butter. Stick margarine. Lard. Shortening. Ghee. Velazquez fat. Tropical oils, such as coconut, palm kernel, or palm oil.  Seasonings and condiments  Onion salt, garlic salt, seasoned salt, table salt, and sea salt. Worcestershire sauce. Tartar sauce. Barbecue sauce. Teriyaki sauce. Soy sauce, including reduced-sodium. Steak sauce. Canned and packaged gravies. Fish sauce. Oyster sauce. Cocktail sauce. Store-bought horseradish. Ketchup. Mustard. Meat flavorings and tenderizers. Bouillon cubes. Hot sauces. Pre-made or packaged marinades. Pre-made or packaged taco seasonings. Relishes. Regular salad dressings.  Other foods  Salted popcorn and pretzels.  The items listed above may not be a complete list of foods and beverages you should avoid. Contact a dietitian for more information.  Where to find more information  · National Heart, Lung, and Blood [place]: www.nhlbi.nih.gov  · American Heart Association: www.heart.org  · Academy of Nutrition and Dietetics: www.eatright.org  · National Kidney Foundation: www.kidney.org  Summary  · The DASH eating plan is a healthy eating plan that has been shown to reduce high blood pressure (hypertension). It may also reduce your risk for type 2 diabetes, heart disease, and stroke.  · When on the DASH eating plan, aim to eat more fresh fruits and vegetables, whole grains, lean proteins, low-fat dairy, and heart-healthy fats.  · With the DASH eating plan, you should limit salt (sodium) intake to 2,300 mg a day. If you have hypertension, you may need to reduce your  sodium intake to 1,500 mg a day.  · Work with your health care provider or dietitian to adjust your eating plan to your individual calorie needs.  This information is not intended to replace advice given to you by your health care provider. Make sure you discuss any questions you have with your health care provider.  Document Revised: 11/20/2020 Document Reviewed: 11/20/2020  [employer] Patient Education © 2021 [employer].    ACP information pamphlet given to the patient.  ACP information provided on the AVS.

## 2021-11-02 PROBLEM — R31.9 HEMATURIA: Status: RESOLVED | Noted: 2021-11-02 | Resolved: 2021-11-02

## 2021-11-02 PROBLEM — R31.9 HEMATURIA: Status: ACTIVE | Noted: 2021-11-02

## 2021-11-02 LAB — BACTERIA SPEC AEROBE CULT: NORMAL

## 2021-12-10 ENCOUNTER — TELEPHONE (OUTPATIENT)
Dept: INTERNAL MEDICINE | Facility: CLINIC | Age: 21
End: 2021-12-10

## 2021-12-10 NOTE — TELEPHONE ENCOUNTER
Spoke to patient she stated that her schedule has been very busy. She still plans on getting the xray done but it might be next week.

## 2022-02-08 ENCOUNTER — TELEPHONE (OUTPATIENT)
Dept: INTERNAL MEDICINE | Facility: CLINIC | Age: 22
End: 2022-02-08

## 2022-02-08 NOTE — TELEPHONE ENCOUNTER
----- Message from Kiesha Vaughan LPN sent at 2/8/2022  3:48 PM EST -----  Regarding: FW: Ultrasound    ----- Message -----  From: Oksana Aguirre  Sent: 2/8/2022   2:43 PM EST  To: [facility]  Subject: Ultrasound                                       Hey, could I ask what the ultrasound is for? Also I need to get an X-ray done as well, are these both something I can walk in for?

## 2022-02-09 NOTE — TELEPHONE ENCOUNTER
I do not see an US ordered either. All I see is from her last visit you wanted a xray to recheck her pneumonia.  I called patient and left a message for her to call the office.

## 2022-02-09 NOTE — TELEPHONE ENCOUNTER
Patient needs an in office visit for recheck as well as chest x-ray which is why it is not yet been ordered  Please schedule patient for an appointment to be seen in office for recheck for resolution of pneumonia.

## 2022-03-25 NOTE — PROGRESS NOTES
Subjective:     Patient ID: Oksana Aguirre is a 17 y.o. female.    CC:   Chief Complaint   Patient presents with   • Numbness       HPI:   History of Present Illness     Oksana is here today for follow up.  On 4/17/18 she presented to the emergency room with complaints of right-sided facial paresthesia with vision changes and speech slurring.  MRI done in the emergency room noted nonspecific white matter changes as well as significant left maxillary sinusitis.  She was at that time started on Augmentin and prednisone and released with follow-up here in the clinic.  When I saw her last she was  complaining of persistent mild tingling on the right side of the face, she had not had any facial droop or problems with eye closure.    She also told  me that for approximately one year she has been struggling with nonspecific vision changes described as blurring she did denies diplopia.  She did also report dizziness and new onset of migraine headaches approximally 4 months ago.  Her headaches are associated with photosensitivity she describes the discomfort as throbbing in the parietal area bilaterally.  She reports she gets a severe migraine rated 8-10/10 every couple weeks.  When she has a migraine she takes ibuprofen and then sleeps them off.  Headaches do seem to be increased with stress and lack of sleep.  She also tells me that in the past several months she's been noticing issues with stuttering and problems with word retrieval.  Speech changes are not necessarily associated with headaches.  She denies syncope but does complain of some dizziness upon standing at times.  She reports she does drink plenty of water daily and does not drink excessive caffeine.  She also denies any drug or alcohol use.  She is not currently sexually active.  She denies any falls or gait disturbance however she does report paresthesias in the bilateral upper extremities as well as her thighs.  She tells me that at times she has trouble  "\"getting her hands to work\".  She did complain of problems with urinary incontinence ongoing for the past couple months, she reports she will urinate on herself without the sensation of need to urinate.  She states she feels generalized muscle weakness that seems to be increased with minimal activity.  Her grandmother and mother are here with her today, there is no family history of neurological disorder outside of her grandmother having a cousin with MS.  Workup ordered last visit included MRI of the cervical and thoracic spine, thoracic spine imaging did indicate T2 hyperintense well-defined fluid signal focus within  the thoracic cord spanning the inferior T6 through inferior T8 levels diameter 2 to 3 mm consistent with prominent central canal and/or syrinx.  She was also found to be lying positive on Western blot, Lyme IgG IgM are pending.  I did go ahead and treat her with doxycycline which she continues at this time.  A few days ago she started noticing some trouble urinating, she went to the emergency room with complaints of urinary retention, urine culture was negative at that time, she did have a Kunz placed in the emergency room and she follows up with urology today.  The following portions of the patient's history were reviewed and updated as appropriate: allergies, current medications, past family history, past medical history, past social history, past surgical history and problem list.    Past Medical History:   Diagnosis Date   • Lyme disease    • Migraine        Past Surgical History:   Procedure Laterality Date   • ADENOIDECTOMY     • EAR TUBES     • INNER EAR SURGERY         Social History     Social History   • Marital status: Single     Spouse name: N/A   • Number of children: N/A   • Years of education: N/A     Occupational History   • Not on file.     Social History Main Topics   • Smoking status: Never Smoker   • Smokeless tobacco: Never Used   • Alcohol use No   • Drug use: No   • Sexual " activity: Defer     Other Topics Concern   • Not on file     Social History Narrative   • No narrative on file       No family history on file.     Review of Systems   Constitutional: Negative.    Eyes: Negative.    Respiratory: Negative.    Cardiovascular: Negative.    Genitourinary: Positive for difficulty urinating.   Musculoskeletal: Negative.    Skin: Negative.    Allergic/Immunologic: Negative.    Neurological: Positive for dizziness, numbness and headaches.        Neurological symptoms persist however they are a bit improved outside of the urinary retention   Hematological: Negative.    Psychiatric/Behavioral: Negative.         Objective:    Neurologic Exam     Mental Status   Oriented to person, place, and time.   Attention: normal. Concentration: normal.   Speech: speech is normal   Level of consciousness: alert  Knowledge: consistent with education.   Able to read. Able to write. Normal comprehension.     Cranial Nerves   Cranial nerves II through XII intact.     CN III, IV, VI   Pupils are equal, round, and reactive to light.  Extraocular motions are normal.     Motor Exam   Muscle bulk: normal  Overall muscle tone: normal  Right arm tone: normal  Left arm tone: normal  Right arm pronator drift: absent  Left arm pronator drift: absent  Right leg tone: normal  Left leg tone: normal    Strength   Strength 5/5 throughout.     Sensory Exam   Light touch normal on the face upper and lower extremities     Gait, Coordination, and Reflexes     Gait  Gait: normal    Coordination   Finger to nose coordination: normal    Tremor   Resting tremor: absent  Intention tremor: absent  Action tremor: absent    Reflexes   Right brachioradialis: 2+  Left brachioradialis: 2+  Right biceps: 2+  Left biceps: 2+  Right triceps: 2+  Left triceps: 2+  Right patellar: 2+  Left patellar: 2+  Right achilles: 2+  Left achilles: 2+  Right : 2+  Left : 2+No clonus       Physical Exam   Constitutional: She is oriented to person,  place, and time. She appears well-developed and well-nourished.   HENT:   Head: Normocephalic and atraumatic.   Eyes: EOM are normal. Pupils are equal, round, and reactive to light.   Neck: Normal range of motion. Neck supple.   Neurological: She is alert and oriented to person, place, and time. She has normal strength. No cranial nerve deficit. She has a normal Finger-Nose-Finger Test. Gait normal.   Reflex Scores:       Tricep reflexes are 2+ on the right side and 2+ on the left side.       Bicep reflexes are 2+ on the right side and 2+ on the left side.       Brachioradialis reflexes are 2+ on the right side and 2+ on the left side.       Patellar reflexes are 2+ on the right side and 2+ on the left side.       Achilles reflexes are 2+ on the right side and 2+ on the left side.  Psychiatric: Her speech is normal.   Vitals reviewed.      Assessment/Plan:       Oksana was seen today for numbness.    Diagnoses and all orders for this visit:    White matter abnormality on MRI of brain  Comments:  Discussed with Dr. Pascual, see below    Paresthesia  Comments:  Persistent    Urinary retention  Comments:  Follow-up with urology today    Positive Lyme disease serology  Comments:  cont doxycycline    Oksana has appointment with neurosurgery on Thursday of this week, I have discussed the case with Dr. Pascual.  In light of her recent onset of urinary symptoms along with her other presenting symptoms I feel that lumbar puncture is warranted to rule out multiple sclerosis or other demyelinating disease.   I discussed this with Oksana and she is agreeable.  I'll touch base with her after her appointment with neurosurgery will set up lumbar puncture accordingly  Reviewed medications, potential side effects and signs and symptoms to report. Discussed risk versus benefits of treatment plan with patient and/or family-including medications, labs and radiology that may be ordered. Addressed questions and concerns during visit.  Patient and/or family verbalized understanding and agree with plan.  During this visit the following were done:  Labs Reviewed [x]    Labs Ordered [x]    Radiology Reports Reviewed [x]    Radiology Ordered []    PCP Records Reviewed []    Referring Provider Records Reviewed []    ER Records Reviewed [x]    Hospital Records Reviewed [x]    History Obtained From Family []    Radiology Images Reviewed [x]    Other Reviewed []    Records Requested []      EMR Dragon/Transcription Disclaimer:  Much of this encounter note is an electronic transcription of spoken language to printed text. Electronic transcription of spoken language may permit erroneous words or phrases to be inadvertently transcribed. Although I have reviewed the note for such errors, some may still exist in this documentation.             Alisson Mohamud, APRN  5/22/2018         Yes

## 2022-11-07 ENCOUNTER — HOSPITAL ENCOUNTER (EMERGENCY)
Facility: HOSPITAL | Age: 22
Discharge: HOME | End: 2022-11-08
Attending: EMERGENCY MEDICINE | Admitting: EMERGENCY MEDICINE

## 2022-11-07 DIAGNOSIS — R10.9 NONSPECIFIC ABDOMINAL PAIN: Primary | ICD-10-CM

## 2022-11-07 DIAGNOSIS — R11.2 NAUSEA AND VOMITING, UNSPECIFIED VOMITING TYPE: ICD-10-CM

## 2022-11-07 LAB
ALBUMIN SERPL-MCNC: 4.5 G/DL (ref 3.5–5.2)
ALBUMIN/GLOB SERPL: 1.6 G/DL
ALP SERPL-CCNC: 74 U/L (ref 39–117)
ALT SERPL W P-5'-P-CCNC: 13 U/L (ref 1–33)
ANION GAP SERPL CALCULATED.3IONS-SCNC: 9 MMOL/L (ref 5–15)
AST SERPL-CCNC: 36 U/L (ref 1–32)
B-HCG UR QL: NEGATIVE
BACTERIA UR QL AUTO: ABNORMAL /HPF
BASOPHILS # BLD AUTO: 0.02 10*3/MM3 (ref 0–0.2)
BASOPHILS NFR BLD AUTO: 0.3 % (ref 0–1.5)
BILIRUB SERPL-MCNC: 0.4 MG/DL (ref 0–1.2)
BILIRUB UR QL STRIP: NEGATIVE
BUN SERPL-MCNC: 9 MG/DL (ref 6–20)
BUN/CREAT SERPL: 11.1 (ref 7–25)
CALCIUM SPEC-SCNC: 8.7 MG/DL (ref 8.6–10.5)
CHLORIDE SERPL-SCNC: 107 MMOL/L (ref 98–107)
CLARITY UR: ABNORMAL
CO2 SERPL-SCNC: 23 MMOL/L (ref 22–29)
COLOR UR: YELLOW
CREAT SERPL-MCNC: 0.81 MG/DL (ref 0.57–1)
DEPRECATED RDW RBC AUTO: 43.5 FL (ref 37–54)
EGFRCR SERPLBLD CKD-EPI 2021: 104.8 ML/MIN/1.73
EOSINOPHIL # BLD AUTO: 0.05 10*3/MM3 (ref 0–0.4)
EOSINOPHIL NFR BLD AUTO: 0.8 % (ref 0.3–6.2)
ERYTHROCYTE [DISTWIDTH] IN BLOOD BY AUTOMATED COUNT: 12.7 % (ref 12.3–15.4)
EXPIRATION DATE: NORMAL
GLOBULIN UR ELPH-MCNC: 2.8 GM/DL
GLUCOSE SERPL-MCNC: 85 MG/DL (ref 65–99)
GLUCOSE UR STRIP-MCNC: NEGATIVE MG/DL
HCT VFR BLD AUTO: 43.4 % (ref 34–46.6)
HGB BLD-MCNC: 14 G/DL (ref 12–15.9)
HGB UR QL STRIP.AUTO: NEGATIVE
HOLD SPECIMEN: NORMAL
HOLD SPECIMEN: NORMAL
HYALINE CASTS UR QL AUTO: ABNORMAL /LPF
IMM GRANULOCYTES # BLD AUTO: 0.01 10*3/MM3 (ref 0–0.05)
IMM GRANULOCYTES NFR BLD AUTO: 0.2 % (ref 0–0.5)
INTERNAL NEGATIVE CONTROL: NORMAL
INTERNAL POSITIVE CONTROL: NORMAL
KETONES UR QL STRIP: ABNORMAL
LEUKOCYTE ESTERASE UR QL STRIP.AUTO: NEGATIVE
LIPASE SERPL-CCNC: 25 U/L (ref 13–60)
LYMPHOCYTES # BLD AUTO: 1.42 10*3/MM3 (ref 0.7–3.1)
LYMPHOCYTES NFR BLD AUTO: 23.1 % (ref 19.6–45.3)
Lab: NORMAL
MCH RBC QN AUTO: 29.7 PG (ref 26.6–33)
MCHC RBC AUTO-ENTMCNC: 32.3 G/DL (ref 31.5–35.7)
MCV RBC AUTO: 92.1 FL (ref 79–97)
MONOCYTES # BLD AUTO: 0.55 10*3/MM3 (ref 0.1–0.9)
MONOCYTES NFR BLD AUTO: 8.9 % (ref 5–12)
NEUTROPHILS NFR BLD AUTO: 4.11 10*3/MM3 (ref 1.7–7)
NEUTROPHILS NFR BLD AUTO: 66.7 % (ref 42.7–76)
NITRITE UR QL STRIP: NEGATIVE
NRBC BLD AUTO-RTO: 0 /100 WBC (ref 0–0.2)
PH UR STRIP.AUTO: <=5 [PH] (ref 5–8)
PLATELET # BLD AUTO: 230 10*3/MM3 (ref 140–450)
PMV BLD AUTO: 10.2 FL (ref 6–12)
POTASSIUM SERPL-SCNC: 4.1 MMOL/L (ref 3.5–5.2)
PROT SERPL-MCNC: 7.3 G/DL (ref 6–8.5)
PROT UR QL STRIP: NEGATIVE
RBC # BLD AUTO: 4.71 10*6/MM3 (ref 3.77–5.28)
RBC # UR STRIP: ABNORMAL /HPF
REF LAB TEST METHOD: ABNORMAL
SODIUM SERPL-SCNC: 139 MMOL/L (ref 136–145)
SP GR UR STRIP: 1.03 (ref 1–1.03)
SQUAMOUS #/AREA URNS HPF: ABNORMAL /HPF
UROBILINOGEN UR QL STRIP: ABNORMAL
WBC # UR STRIP: ABNORMAL /HPF
WBC NRBC COR # BLD: 6.16 10*3/MM3 (ref 3.4–10.8)
WHOLE BLOOD HOLD COAG: NORMAL
WHOLE BLOOD HOLD SPECIMEN: NORMAL

## 2022-11-07 PROCEDURE — 83690 ASSAY OF LIPASE: CPT

## 2022-11-07 PROCEDURE — 81025 URINE PREGNANCY TEST: CPT | Performed by: EMERGENCY MEDICINE

## 2022-11-07 PROCEDURE — 80053 COMPREHEN METABOLIC PANEL: CPT

## 2022-11-07 PROCEDURE — 81001 URINALYSIS AUTO W/SCOPE: CPT

## 2022-11-07 PROCEDURE — 99283 EMERGENCY DEPT VISIT LOW MDM: CPT

## 2022-11-07 PROCEDURE — 93005 ELECTROCARDIOGRAM TRACING: CPT | Performed by: EMERGENCY MEDICINE

## 2022-11-07 PROCEDURE — 85025 COMPLETE CBC W/AUTO DIFF WBC: CPT

## 2022-11-07 PROCEDURE — 93005 ELECTROCARDIOGRAM TRACING: CPT

## 2022-11-07 PROCEDURE — 81025 URINE PREGNANCY TEST: CPT

## 2022-11-07 RX ORDER — SODIUM CHLORIDE 9 MG/ML
10 INJECTION INTRAVENOUS AS NEEDED
Status: DISCONTINUED | OUTPATIENT
Start: 2022-11-07 | End: 2022-11-08 | Stop reason: HOSPADM

## 2022-11-08 ENCOUNTER — HOSPITAL ENCOUNTER (OUTPATIENT)
Facility: HOSPITAL | Age: 22
Setting detail: OBSERVATION
Discharge: HOME | End: 2022-11-09
Attending: EMERGENCY MEDICINE | Admitting: HOSPITALIST

## 2022-11-08 ENCOUNTER — APPOINTMENT (OUTPATIENT)
Dept: CT IMAGING | Facility: HOSPITAL | Age: 22
End: 2022-11-08

## 2022-11-08 ENCOUNTER — APPOINTMENT (OUTPATIENT)
Dept: GENERAL RADIOLOGY | Facility: HOSPITAL | Age: 22
End: 2022-11-08

## 2022-11-08 VITALS
HEIGHT: 62 IN | BODY MASS INDEX: 21.16 KG/M2 | HEART RATE: 101 BPM | WEIGHT: 115 LBS | SYSTOLIC BLOOD PRESSURE: 137 MMHG | OXYGEN SATURATION: 99 % | RESPIRATION RATE: 18 BRPM | TEMPERATURE: 98.5 F | DIASTOLIC BLOOD PRESSURE: 95 MMHG

## 2022-11-08 DIAGNOSIS — R11.2 NAUSEA AND VOMITING, UNSPECIFIED VOMITING TYPE: ICD-10-CM

## 2022-11-08 DIAGNOSIS — M62.82 NON-TRAUMATIC RHABDOMYOLYSIS: Primary | ICD-10-CM

## 2022-11-08 DIAGNOSIS — R10.9 ACUTE ABDOMINAL PAIN: ICD-10-CM

## 2022-11-08 PROBLEM — E87.29 STARVATION KETOACIDOSIS: Status: ACTIVE | Noted: 2022-11-08

## 2022-11-08 PROBLEM — R11.10 RECURRENT VOMITING: Status: ACTIVE | Noted: 2022-11-08

## 2022-11-08 PROBLEM — K59.00 ACUTE CONSTIPATION: Status: ACTIVE | Noted: 2022-11-08

## 2022-11-08 PROBLEM — R00.0 TACHYCARDIA: Status: ACTIVE | Noted: 2022-11-08

## 2022-11-08 PROBLEM — T73.0XXA STARVATION KETOACIDOSIS: Status: ACTIVE | Noted: 2022-11-08

## 2022-11-08 PROBLEM — E86.0 DEHYDRATION: Status: ACTIVE | Noted: 2022-11-08

## 2022-11-08 PROBLEM — R82.4 KETONURIA: Status: ACTIVE | Noted: 2022-11-08

## 2022-11-08 LAB
ALBUMIN SERPL-MCNC: 4.1 G/DL (ref 3.5–5.2)
ALBUMIN/GLOB SERPL: 1.4 G/DL
ALP SERPL-CCNC: 75 U/L (ref 39–117)
ALT SERPL W P-5'-P-CCNC: 14 U/L (ref 1–33)
AMPHET+METHAMPHET UR QL: NEGATIVE
AMPHETAMINES UR QL: NEGATIVE
ANION GAP SERPL CALCULATED.3IONS-SCNC: 11 MMOL/L (ref 5–15)
AST SERPL-CCNC: 34 U/L (ref 1–32)
B-HCG UR QL: NEGATIVE
BACTERIA UR QL AUTO: ABNORMAL /HPF
BARBITURATES UR QL SCN: NEGATIVE
BASOPHILS # BLD AUTO: 0.02 10*3/MM3 (ref 0–0.2)
BASOPHILS NFR BLD AUTO: 0.4 % (ref 0–1.5)
BENZODIAZ UR QL SCN: NEGATIVE
BILIRUB SERPL-MCNC: 0.5 MG/DL (ref 0–1.2)
BILIRUB UR QL STRIP: NEGATIVE
BUN SERPL-MCNC: 11 MG/DL (ref 6–20)
BUN/CREAT SERPL: 14.7 (ref 7–25)
BUPRENORPHINE SERPL-MCNC: NEGATIVE NG/ML
CALCIUM SPEC-SCNC: 8.5 MG/DL (ref 8.6–10.5)
CANNABINOIDS SERPL QL: POSITIVE
CHLORIDE SERPL-SCNC: 103 MMOL/L (ref 98–107)
CK SERPL-CCNC: 913 U/L (ref 20–180)
CLARITY UR: ABNORMAL
CO2 SERPL-SCNC: 20 MMOL/L (ref 22–29)
COCAINE UR QL: NEGATIVE
COLOR UR: YELLOW
CREAT SERPL-MCNC: 0.75 MG/DL (ref 0.57–1)
D-LACTATE SERPL-SCNC: 0.8 MMOL/L (ref 0.5–2)
DEPRECATED RDW RBC AUTO: 43.1 FL (ref 37–54)
EGFRCR SERPLBLD CKD-EPI 2021: 114.9 ML/MIN/1.73
EOSINOPHIL # BLD AUTO: 0.01 10*3/MM3 (ref 0–0.4)
EOSINOPHIL NFR BLD AUTO: 0.2 % (ref 0.3–6.2)
ERYTHROCYTE [DISTWIDTH] IN BLOOD BY AUTOMATED COUNT: 12.9 % (ref 12.3–15.4)
EXPIRATION DATE: NORMAL
FLUAV RNA RESP QL NAA+PROBE: NOT DETECTED
FLUBV RNA RESP QL NAA+PROBE: NOT DETECTED
GLOBULIN UR ELPH-MCNC: 3 GM/DL
GLUCOSE SERPL-MCNC: 83 MG/DL (ref 65–99)
GLUCOSE UR STRIP-MCNC: NEGATIVE MG/DL
HAV IGM SERPL QL IA: NORMAL
HBV CORE IGM SERPL QL IA: NORMAL
HBV SURFACE AG SERPL QL IA: NORMAL
HCT VFR BLD AUTO: 43.1 % (ref 34–46.6)
HCV AB SER DONR QL: NORMAL
HGB BLD-MCNC: 14.1 G/DL (ref 12–15.9)
HGB UR QL STRIP.AUTO: ABNORMAL
HOLD SPECIMEN: NORMAL
HYALINE CASTS UR QL AUTO: ABNORMAL /LPF
IMM GRANULOCYTES # BLD AUTO: 0.02 10*3/MM3 (ref 0–0.05)
IMM GRANULOCYTES NFR BLD AUTO: 0.4 % (ref 0–0.5)
INTERNAL NEGATIVE CONTROL: NORMAL
INTERNAL POSITIVE CONTROL: NORMAL
KETONES UR QL STRIP: ABNORMAL
LEUKOCYTE ESTERASE UR QL STRIP.AUTO: ABNORMAL
LIPASE SERPL-CCNC: 34 U/L (ref 13–60)
LYMPHOCYTES # BLD AUTO: 0.78 10*3/MM3 (ref 0.7–3.1)
LYMPHOCYTES NFR BLD AUTO: 16.9 % (ref 19.6–45.3)
Lab: NORMAL
MCH RBC QN AUTO: 30.1 PG (ref 26.6–33)
MCHC RBC AUTO-ENTMCNC: 32.7 G/DL (ref 31.5–35.7)
MCV RBC AUTO: 91.9 FL (ref 79–97)
METHADONE UR QL SCN: NEGATIVE
MONOCYTES # BLD AUTO: 0.3 10*3/MM3 (ref 0.1–0.9)
MONOCYTES NFR BLD AUTO: 6.5 % (ref 5–12)
MUCOUS THREADS URNS QL MICRO: ABNORMAL /HPF
NEUTROPHILS NFR BLD AUTO: 3.48 10*3/MM3 (ref 1.7–7)
NEUTROPHILS NFR BLD AUTO: 75.6 % (ref 42.7–76)
NITRITE UR QL STRIP: NEGATIVE
NRBC BLD AUTO-RTO: 0 /100 WBC (ref 0–0.2)
OPIATES UR QL: NEGATIVE
OXYCODONE UR QL SCN: NEGATIVE
PCP UR QL SCN: NEGATIVE
PH UR STRIP.AUTO: 5.5 [PH] (ref 5–8)
PLATELET # BLD AUTO: 213 10*3/MM3 (ref 140–450)
PMV BLD AUTO: 10.2 FL (ref 6–12)
POTASSIUM SERPL-SCNC: 4 MMOL/L (ref 3.5–5.2)
PROPOXYPH UR QL: NEGATIVE
PROT SERPL-MCNC: 7.1 G/DL (ref 6–8.5)
PROT UR QL STRIP: ABNORMAL
QT INTERVAL: 342 MS
QTC INTERVAL: 454 MS
RBC # BLD AUTO: 4.69 10*6/MM3 (ref 3.77–5.28)
RBC # UR STRIP: ABNORMAL /HPF
REF LAB TEST METHOD: ABNORMAL
SARS-COV-2 RNA RESP QL NAA+PROBE: NOT DETECTED
SODIUM SERPL-SCNC: 134 MMOL/L (ref 136–145)
SP GR UR STRIP: >=1.03 (ref 1–1.03)
SQUAMOUS #/AREA URNS HPF: ABNORMAL /HPF
TRICYCLICS UR QL SCN: NEGATIVE
UROBILINOGEN UR QL STRIP: ABNORMAL
WBC # UR STRIP: ABNORMAL /HPF
WBC NRBC COR # BLD: 4.61 10*3/MM3 (ref 3.4–10.8)
WHOLE BLOOD HOLD COAG: NORMAL
WHOLE BLOOD HOLD SPECIMEN: NORMAL

## 2022-11-08 PROCEDURE — 96374 THER/PROPH/DIAG INJ IV PUSH: CPT

## 2022-11-08 PROCEDURE — 99284 EMERGENCY DEPT VISIT MOD MDM: CPT

## 2022-11-08 PROCEDURE — 93005 ELECTROCARDIOGRAM TRACING: CPT | Performed by: EMERGENCY MEDICINE

## 2022-11-08 PROCEDURE — G0378 HOSPITAL OBSERVATION PER HR: HCPCS

## 2022-11-08 PROCEDURE — 82550 ASSAY OF CK (CPK): CPT | Performed by: NURSE PRACTITIONER

## 2022-11-08 PROCEDURE — 63710000001 ONDANSETRON ODT 4 MG TABLET DISPERSIBLE: Performed by: EMERGENCY MEDICINE

## 2022-11-08 PROCEDURE — 83690 ASSAY OF LIPASE: CPT | Performed by: EMERGENCY MEDICINE

## 2022-11-08 PROCEDURE — C9803 HOPD COVID-19 SPEC COLLECT: HCPCS

## 2022-11-08 PROCEDURE — 96361 HYDRATE IV INFUSION ADD-ON: CPT

## 2022-11-08 PROCEDURE — 71045 X-RAY EXAM CHEST 1 VIEW: CPT

## 2022-11-08 PROCEDURE — 83605 ASSAY OF LACTIC ACID: CPT | Performed by: NURSE PRACTITIONER

## 2022-11-08 PROCEDURE — 36415 COLL VENOUS BLD VENIPUNCTURE: CPT

## 2022-11-08 PROCEDURE — 81001 URINALYSIS AUTO W/SCOPE: CPT | Performed by: EMERGENCY MEDICINE

## 2022-11-08 PROCEDURE — 74177 CT ABD & PELVIS W/CONTRAST: CPT

## 2022-11-08 PROCEDURE — 96375 TX/PRO/DX INJ NEW DRUG ADDON: CPT

## 2022-11-08 PROCEDURE — 87636 SARSCOV2 & INF A&B AMP PRB: CPT | Performed by: NURSE PRACTITIONER

## 2022-11-08 PROCEDURE — 81025 URINE PREGNANCY TEST: CPT | Performed by: EMERGENCY MEDICINE

## 2022-11-08 PROCEDURE — 99220 PR INITIAL OBSERVATION CARE/DAY 70 MINUTES: CPT | Performed by: HOSPITALIST

## 2022-11-08 PROCEDURE — 80074 ACUTE HEPATITIS PANEL: CPT | Performed by: HOSPITALIST

## 2022-11-08 PROCEDURE — 85025 COMPLETE CBC W/AUTO DIFF WBC: CPT | Performed by: EMERGENCY MEDICINE

## 2022-11-08 PROCEDURE — 80053 COMPREHEN METABOLIC PANEL: CPT | Performed by: EMERGENCY MEDICINE

## 2022-11-08 PROCEDURE — 25010000002 ONDANSETRON PER 1 MG: Performed by: NURSE PRACTITIONER

## 2022-11-08 PROCEDURE — 25010000002 IOPAMIDOL 61 % SOLUTION: Performed by: EMERGENCY MEDICINE

## 2022-11-08 PROCEDURE — 80306 DRUG TEST PRSMV INSTRMNT: CPT | Performed by: HOSPITALIST

## 2022-11-08 RX ORDER — SODIUM CHLORIDE 9 MG/ML
10 INJECTION INTRAVENOUS AS NEEDED
Status: DISCONTINUED | OUTPATIENT
Start: 2022-11-08 | End: 2022-11-09 | Stop reason: HOSPADM

## 2022-11-08 RX ORDER — SODIUM CHLORIDE 0.9 % (FLUSH) 0.9 %
10 SYRINGE (ML) INJECTION EVERY 12 HOURS SCHEDULED
Status: DISCONTINUED | OUTPATIENT
Start: 2022-11-08 | End: 2022-11-09 | Stop reason: HOSPADM

## 2022-11-08 RX ORDER — ONDANSETRON 2 MG/ML
4 INJECTION INTRAMUSCULAR; INTRAVENOUS EVERY 6 HOURS PRN
Status: DISCONTINUED | OUTPATIENT
Start: 2022-11-08 | End: 2022-11-09 | Stop reason: HOSPADM

## 2022-11-08 RX ORDER — ONDANSETRON 4 MG/1
4 TABLET, ORALLY DISINTEGRATING ORAL ONCE
Status: COMPLETED | OUTPATIENT
Start: 2022-11-08 | End: 2022-11-08

## 2022-11-08 RX ORDER — SODIUM CHLORIDE 0.9 % (FLUSH) 0.9 %
10 SYRINGE (ML) INJECTION AS NEEDED
Status: DISCONTINUED | OUTPATIENT
Start: 2022-11-08 | End: 2022-11-09 | Stop reason: HOSPADM

## 2022-11-08 RX ORDER — DOCUSATE SODIUM 100 MG/1
100 CAPSULE, LIQUID FILLED ORAL 2 TIMES DAILY
Status: DISCONTINUED | OUTPATIENT
Start: 2022-11-08 | End: 2022-11-09 | Stop reason: HOSPADM

## 2022-11-08 RX ORDER — ONDANSETRON 2 MG/ML
4 INJECTION INTRAMUSCULAR; INTRAVENOUS ONCE
Status: COMPLETED | OUTPATIENT
Start: 2022-11-08 | End: 2022-11-08

## 2022-11-08 RX ORDER — FAMOTIDINE 10 MG/ML
20 INJECTION, SOLUTION INTRAVENOUS ONCE
Status: COMPLETED | OUTPATIENT
Start: 2022-11-08 | End: 2022-11-08

## 2022-11-08 RX ORDER — PROCHLORPERAZINE EDISYLATE 5 MG/ML
5 INJECTION INTRAMUSCULAR; INTRAVENOUS EVERY 4 HOURS PRN
Status: DISCONTINUED | OUTPATIENT
Start: 2022-11-08 | End: 2022-11-09 | Stop reason: HOSPADM

## 2022-11-08 RX ORDER — ONDANSETRON 4 MG/1
4 TABLET, ORALLY DISINTEGRATING ORAL EVERY 6 HOURS PRN
Qty: 15 TABLET | Refills: 0 | Status: SHIPPED | OUTPATIENT
Start: 2022-11-08 | End: 2023-02-22 | Stop reason: SDUPTHER

## 2022-11-08 RX ADMIN — SODIUM CHLORIDE 1000 ML: 9 INJECTION, SOLUTION INTRAVENOUS at 09:54

## 2022-11-08 RX ADMIN — ONDANSETRON 4 MG: 2 INJECTION INTRAMUSCULAR; INTRAVENOUS at 09:54

## 2022-11-08 RX ADMIN — Medication 10 ML: at 20:06

## 2022-11-08 RX ADMIN — SODIUM CHLORIDE 1000 ML: 9 INJECTION, SOLUTION INTRAVENOUS at 18:15

## 2022-11-08 RX ADMIN — IOPAMIDOL 85 ML: 612 INJECTION, SOLUTION INTRAVENOUS at 10:16

## 2022-11-08 RX ADMIN — SODIUM CHLORIDE 1000 ML: 9 INJECTION, SOLUTION INTRAVENOUS at 15:53

## 2022-11-08 RX ADMIN — FAMOTIDINE 20 MG: 10 INJECTION INTRAVENOUS at 09:54

## 2022-11-08 RX ADMIN — ONDANSETRON 4 MG: 4 TABLET, ORALLY DISINTEGRATING ORAL at 00:46

## 2022-11-08 RX ADMIN — SODIUM CHLORIDE 1000 ML: 9 INJECTION, SOLUTION INTRAVENOUS at 10:48

## 2022-11-08 RX ADMIN — Medication 10 ML: at 20:07

## 2022-11-08 NOTE — DISCHARGE INSTRUCTIONS
If your symptoms have not resolved by morning I recommend that you come back to the emergency department for thorough evaluation.    Take Zofran as directed.    Clear liquid diet for 24 hours and then slowly advance diet as tolerated.    If you have any concern of worsening condition at any point please return to the emergency department immediately.

## 2022-11-08 NOTE — PLAN OF CARE
Goal Outcome Evaluation:  Plan of Care Reviewed With: patient        Progress: no change  Outcome Evaluation: VSS, no c/o, NPO, will continue to monitor

## 2022-11-08 NOTE — H&P
"    [facility] Medicine Services  HISTORY AND PHYSICAL    Patient Name: Oksana Aguirre  : 2000  MRN: [id number]  Primary Care Physician: Rizwana Odonnell APRN  Date of admission: 2022      Subjective   Subjective     Chief Complaint:    Persistent Nausea / Vomiting - went to [facility] yesterday (Monday)    HPI:  Oksana Aguirre is a 22 y.o. female with history of intermittent high blood pressure without a need for anti-hypertensive therapy, migraines, and allergies who presented to the ER with not feeling well and persistent nausea and vomiting and abdominal pain.  She was in the ER last night for the same but went home after midnight.    She went to her PCPs office on Monday and was sent to the ER for evaluation.  She was discharged from the ER with nonspecific abdominal pain and nausea and vomiting.   She was advised to follow up with PCP in 1 day or to return to the ER if her symptoms got worse.  The ER was quite busy last night and after being there for over 4 hours she felt the need to leave for a \"family emergency\" and was discharged from the ER after midnight it appears.  No CT scan was done overnight.  She received a prescription for oral Zofran    She continued to not feel well and went out to walk and after only like 50 feet of walking was out of breath and felt sweaty and not well.  She reports vomiting 6-8 times in the last 24 hours refractory to Zofran and she returned to the ER this morning.   CT of the Abdomen and Pelvis done this morning with no acute findings but not feeling well enough to go home.    She is quite dehydrated on exam and has not been able to eat enough to support herself.  She received 2 - 1 L normal saline boluses and was still dehydrated, tachycardia (120s in ER) and had soft blood pressure.  It took 4 Liters to improve her blood pressure and heart rate.  UDS + for THC.  Urine Specific Gravity was high and it was hard to collect a urine sample by " her own admission.  She has no symptoms of Urinary tract infection and has contaminated Urine sample.    She has evidence for starvation ketosis due to not eating enough to support herself recently due to being sick.  She has been constipated in the last few days which is unusual for her but associated with dehydration which is clearly present on admission.      Review of Systems     Gen- No fevers, chills  CV- No chest pain, palpitations  Resp- No cough, dyspnea  GI - Nausea + / Vomiting + / Abdominal Pain +      All other systems reviewed and are negative.     Personal History     Past Medical History:   Diagnosis Date   • Hypertension    • Migraine    allergic asthma  Ruptured Tympanic Membrane      Past Surgical History:   Procedure Laterality Date   • ADENOIDECTOMY     • EAR TUBES Bilateral    • INNER EAR SURGERY Right        Family History:   Father - no known medical problems  Mother - crohn's disease     family history includes Alcohol abuse in her father; Anemia in her maternal grandmother; Cancer in her maternal grandfather and maternal grandmother; Hypertension in her maternal grandmother; Parkinsonism in her paternal grandfather. Otherwise pertinent FHx was reviewed and unremarkable.     Social History:  reports that she has never smoked. She has never used smokeless tobacco. She reports current alcohol use. She reports that she does not use drugs.  Social History     Social History Narrative    Mother and grandmother with pt today     Single  No kids  Denies smoking  Denies alcoholism  Denies drug abuse    Medications:  Available home medication information reviewed.  Medications Prior to Admission   Medication Sig Dispense Refill Last Dose   • ondansetron ODT (ZOFRAN-ODT) 4 MG disintegrating tablet Place 1 tablet on the tongue Every 6 (Six) Hours As Needed for Nausea or Vomiting. As needed for nausea 15 tablet 0 Unknown       Allergies   Allergen Reactions   • Doxycycline GI Intolerance       Objective    Objective     Vital Signs:   Temp:  [97.8 °F (36.6 °C)-98.5 °F (36.9 °C)] 98.3 °F (36.8 °C)  Heart Rate:  [] 100  Resp:  [16-18] 17  BP: (102-137)/(58-95) 117/69       Physical Exam     Constitutional: Awake, alert  Eyes: PERRLA, sclerae anicteric, no conjunctival injection  HENT: NCAT, dry tongue  Neck: Supple, no JVD, trachea midline  Respiratory: Clear to auscultation bilaterally, nonlabored respirations   Cardiovascular:  Tachycardia, s1 and s2  Gastrointestinal: Positive bowel sounds, soft, generalized abdominal wall tenderness, worse in Left Lower Quadrant and low abdomen, no guarding, no rebound  Musculoskeletal: No bilateral ankle edema, no clubbing or cyanosis to extremities  Psychiatric: Appropriate affect, cooperative  Neurologic: Oriented x 3, non focal, speech clear  Skin: dry, + skin tenting    Result Review:  I have personally reviewed the results from the time of this admission to 11/8/2022 18:16 EST and agree with these findings:  [x]  Laboratory list / accordion  []  Microbiology  [x]  Radiology  []  EKG/Telemetry   []  Cardiology/Vascular   []  Pathology  []  Old records  []  Other:  Most notable findings include:   Dehydration / persistent Nausea and Vomiting / poor oral intake and ketones in blood and urine      LAB RESULTS:      Lab 11/08/22  0836 11/07/22 2048   WBC 4.61 6.16   HEMOGLOBIN 14.1 14.0   HEMATOCRIT 43.1 43.4   PLATELETS 213 230   NEUTROS ABS 3.48 4.11   IMMATURE GRANS (ABS) 0.02 0.01   LYMPHS ABS 0.78 1.42   MONOS ABS 0.30 0.55   EOS ABS 0.01 0.05   MCV 91.9 92.1   LACTATE 0.8  --          Lab 11/08/22  0836 11/07/22 2048   SODIUM 134* 139   POTASSIUM 4.0 4.1   CHLORIDE 103 107   CO2 20.0* 23.0   ANION GAP 11.0 9.0   BUN 11 9   CREATININE 0.75 0.81   EGFR 114.9 104.8   GLUCOSE 83 85   CALCIUM 8.5* 8.7         Lab 11/08/22  0836 11/07/22 2048   TOTAL PROTEIN 7.1 7.3   ALBUMIN 4.10 4.50   GLOBULIN 3.0 2.8   ALT (SGPT) 14 13   AST (SGOT) 34* 36*   BILIRUBIN 0.5 0.4   ALK  PHOS 75 74   LIPASE 34 25                     UA    Urinalysis 11/7/22 11/7/22 11/8/22 11/8/22    2053 2053 0828 0828   Squamous Epithelial Cells, UA  3-6 (A)  31-50 (A)   Specific [place], UA 1.028  >=1.030    Ketones, UA Trace (A)  15 mg/dL (1+) (A)    Blood, UA Negative  Trace (A)    Leukocytes, UA Negative  Trace (A)    Nitrite, UA Negative  Negative    RBC, UA  3-6 (A)  7-12 (A)   WBC, UA  0-2  13-20 (A)   Bacteria, UA  None Seen  1+ (A)   (A) Abnormal value              Microbiology Results (last 10 days)     Procedure Component Value - Date/Time    COVID PRE-OP / PRE-PROCEDURE SCREENING ORDER (NO ISOLATION) - Swab, Nasopharynx [[id number]]  (Normal) Collected: 11/08/22 1050    Lab Status: Final result Specimen: Swab from Nasopharynx Updated: 11/08/22 1126    Narrative:      The following orders were created for panel order COVID PRE-OP / PRE-PROCEDURE SCREENING ORDER (NO ISOLATION) - Swab, Nasopharynx.  Procedure                               Abnormality         Status                     ---------                               -----------         ------                     COVID-19 and FLU A/B PCR...[[id number]]  Normal              Final result                 Please view results for these tests on the individual orders.    COVID-19 and FLU A/B PCR - Swab, Nasopharynx [[id number]]  (Normal) Collected: 11/08/22 1050    Lab Status: Final result Specimen: Swab from Nasopharynx Updated: 11/08/22 1126     COVID19 Not Detected     Influenza A PCR Not Detected     Influenza B PCR Not Detected    Narrative:      Fact sheet for providers: https://www.fda.gov/media/240507/download    Fact sheet for patients: https://www.fda.gov/media/074516/download    Test performed by PCR.          CT Abdomen Pelvis With Contrast    Result Date: 11/8/2022  DATE OF EXAM: 11/8/2022 9:59 AM  PROCEDURE: CT ABDOMEN PELVIS W CONTRAST-  INDICATIONS: diffuse abd pain  COMPARISON: 1/6/2019  TECHNIQUE: Routine transaxial slices were obtained  through the abdomen and pelvis after the intravenous administration of 85 mL of Isovue 300. Reconstructed coronal and sagittal images were also obtained. Automated exposure control and iterative construction methods were used.  The radiation dose reduction device was turned on for each scan per the ALARA (As Low as Reasonably Achievable) protocol.  FINDINGS: The lung bases are clear without evidence for focal consolidation or significant pleural effusion.  The liver, spleen, and pancreas are unremarkable. The gallbladder and bile ducts are unremarkable. The bilateral adrenal glands are symmetric and unremarkable. The bilateral kidneys are symmetric and unremarkable.  There is no bowel dilatation or obstruction. A normal-appearing appendix is observed adjacent to the distal ileum extending towards the midline of the pelvis. There is no evidence for acute appendicitis. Small free fluid is seen in the pelvis likely physiologic in nature related to ovulation. Physiologic ovarian cysts are noted. No abnormal fluid collections are identified. No significant lymphadenopathy is seen throughout the abdomen or pelvis. The bladder is partially decompressed. The celiac and superior mesenteric arterial distributions are opacified without evidence for occlusion.  No acute osseous abnormalities are observed.      Impression: 1.  No evidence for acute abnormality throughout the abdomen or pelvis. No evidence for acute appendicitis.  This report was finalized on 11/8/2022 10:36 AM by Kenneth Tsai MD.      XR Chest 1 View    Result Date: 11/8/2022  DATE OF EXAM: 11/8/2022 10:17 AM  PROCEDURE: XR CHEST 1 VW-  INDICATIONS: soa cough  COMPARISON: 09/22/2021  TECHNIQUE: Single radiographic AP view of the chest was obtained.  FINDINGS: Scoliosis. Heart size and pulmonary vasculature are within normal limits. Lungs clear. Costophrenic angles sharp      Impression: No radiographic findings of pneumonia  This report was finalized on  11/8/2022 10:32 AM by Amrik Galarza.            Assessment & Plan   Assessment & Plan     Active Hospital Problems    Diagnosis  POA   • **Recurrent vomiting [R11.10]  Yes   • Non-traumatic rhabdomyolysis [M62.82]  Yes   • Dehydration [E86.0]  Yes   • Tachycardia [R00.0]  Yes   • Ketonuria [R82.4]  Yes   • Starvation ketoacidosis [T73.0XXA, E87.29]  Yes       Severe Dehydration  - poor oral intake and vomiting  - dehydrated on exam even after IV fluids given in ER  - may need to consider doing Orthostatics after volume resuscitation to see if she needs more fluid  Abdominal Pain  - this could be as simple as abdominal muscle contractions from retching  - she reports vomiting 6-8 times in the last 24 hours  - she couldn't sleep  - CT abdomen and pelvis with findings not consistent with abdominal exam which would point to low abdomen issue  Ketonuria  - starvation Ketosis from poor oral intake  Tachycardia  - this is a compensatory mechanism for Hypovolemia  - give IV fluids until improved  Contaminated Urinalysis  - no burning or pain with urination  - concentrated Urine  - no fevers  - will observe  - had Urinalysis on 11/7 too  Nausea / Vomiting   - as needed anti-emetics  CK elevation  - not severe  - says she works out a couple times per week  - probably an incidental finding    Hopefully home on 11/9 after re-hydration    DVT prophylaxis:   SCDs    CODE STATUS:   Full Code   Code Status and Medical Interventions:   Ordered at: 11/08/22 1452     Level Of Support Discussed With:    Patient     Code Status (Patient has no pulse and is not breathing):    CPR (Attempt to Resuscitate)     Medical Interventions (Patient has pulse or is breathing):    Full Support     Release to patient:    Routine Release         Andriy Lora MD  11/08/22

## 2022-11-08 NOTE — ED PROVIDER NOTES
Subjective   History of Present Illness  ruq and rlq abd pain x 2 days. Nv. No fever. Has had a cough. Sent from [facility] for eval. Here last night but left lobby as it as busy. Did have labs.    Abdominal Pain  Pain quality: aching and cramping    Pain radiates to:  Does not radiate  Pain severity:  Moderate  Onset quality:  Gradual  Timing:  Constant  Progression:  Waxing and waning  Context: eating    Relieved by:  Eating  Worsened by:  Movement and eating  Associated symptoms: cough, nausea and vomiting    Associated symptoms: no chest pain, no chills, no constipation, no diarrhea, no dysuria, no fever, no hematuria, no shortness of breath and no sore throat        Review of Systems   Constitutional: Negative for chills, diaphoresis and fever.   HENT: Negative for congestion and sore throat.    Respiratory: Positive for cough. Negative for choking, chest tightness, shortness of breath and wheezing.    Cardiovascular: Negative for chest pain and leg swelling.   Gastrointestinal: Positive for abdominal pain, nausea and vomiting. Negative for abdominal distention, anal bleeding, blood in stool, constipation and diarrhea.   Genitourinary: Negative for difficulty urinating, dysuria, flank pain, frequency, hematuria and urgency.   All other systems reviewed and are negative.      Past Medical History:   Diagnosis Date   • Hypertension    • Migraine        Allergies   Allergen Reactions   • Doxycycline GI Intolerance       Past Surgical History:   Procedure Laterality Date   • ADENOIDECTOMY     • EAR TUBES Bilateral    • INNER EAR SURGERY Right        Family History   Problem Relation Age of Onset   • Alcohol abuse Father    • Hypertension Maternal Grandmother    • Anemia Maternal Grandmother    • Cancer Maternal Grandmother    • Cancer Maternal Grandfather    • Parkinsonism Paternal Grandfather        Social History     Socioeconomic History   • Marital status: Single   Tobacco Use   • Smoking status: Never   • Smokeless  tobacco: Never   Vaping Use   • Vaping Use: Never used   Substance and Sexual Activity   • Alcohol use: Yes   • Drug use: No   • Sexual activity: Defer     Birth control/protection: Injection           Objective   Physical Exam  Constitutional:       Appearance: She is well-developed.   HENT:      Head: Normocephalic and atraumatic.      Right Ear: External ear normal.      Left Ear: External ear normal.      Nose: Nose normal.   Eyes:      Conjunctiva/sclera: Conjunctivae normal.      Pupils: Pupils are equal, round, and reactive to light.   Cardiovascular:      Rate and Rhythm: Normal rate and regular rhythm.      Heart sounds: Normal heart sounds.   Pulmonary:      Effort: Pulmonary effort is normal.      Breath sounds: Normal breath sounds.   Abdominal:      General: Bowel sounds are normal.      Palpations: Abdomen is soft.      Tenderness: There is abdominal tenderness in the right upper quadrant and right lower quadrant.   Musculoskeletal:         General: Normal range of motion.      Cervical back: Normal range of motion and neck supple.   Skin:     General: Skin is warm and dry.   Neurological:      Mental Status: She is alert and oriented to person, place, and time.   Psychiatric:         Behavior: Behavior normal.         Thought Content: Thought content normal.         Judgment: Judgment normal.         Procedures           ED Course  ED Course as of 11/08/22 1417   Tue Nov 08, 2022   1415 Patient still with nausea vomiting.  CK level elevated.  Patient abdomen .  She tells me she does workout frequently.  CT overall reassuring.  We will need to admit for further evaluation for rhabdo. [JM]      ED Course User Index  [JM] Elias Miranda APRN                XR Chest 1 View   Final Result   No radiographic findings of pneumonia       This report was finalized on 11/8/2022 10:32 AM by Amrik Galarza.          CT Abdomen Pelvis With Contrast   Final Result   1.  No evidence for acute abnormality  throughout the abdomen or pelvis.   No evidence for acute appendicitis.       This report was finalized on 11/8/2022 10:36 AM by Kenneth Tsai MD.                                       [facility]    Final diagnoses:   Non-traumatic rhabdomyolysis   Acute abdominal pain   Nausea and vomiting, unspecified vomiting type       ED Disposition  ED Disposition     ED Disposition   Decision to Admit    Condition   --    Comment   --             No follow-up provider specified.       Medication List      No changes were made to your prescriptions during this visit.          Elias Miranda, APRN  11/08/22 1736

## 2022-11-08 NOTE — ED PROVIDER NOTES
EMERGENCY DEPARTMENT ENCOUNTER    Pt Name: Oksana Aguirre  MRN: [id number]  Pt :   2000  Room Number:  Room/bed info not found  Date of encounter:  2022  PCP: Rizwana Odonnell APRN  ED Provider: Bay Monte MD    Historian: Patient      HPI:  Chief Complaint: Abdominal pain and vomiting        Context: Oksana Aguirre is a 22 y.o. female who presents to the ED c/o symptoms which started as nausea and vomiting with head diffuse abdominal pain little before noon today.  She continued to have multiple bouts of emesis but no diarrhea.  She believes her last bowel movement was within the last 2 days.  She presented to a primary care provider's office and was sent to the emergency department for further evaluation.  She denies previous abdominal surgeries.  She rates her discomfort moderate in severity.  She continues to feel quite nauseated and has been unable to consume any food and little fluids since the onset of her symptoms.  She denies fevers, chills, dysuria, hematuria.  She has not taken any medications for symptom relief.      PAST MEDICAL HISTORY  Past Medical History:   Diagnosis Date   • Hypertension    • Migraine          PAST SURGICAL HISTORY  Past Surgical History:   Procedure Laterality Date   • ADENOIDECTOMY     • EAR TUBES Bilateral    • INNER EAR SURGERY Right          FAMILY HISTORY  Family History   Problem Relation Age of Onset   • Alcohol abuse Father    • Hypertension Maternal Grandmother    • Anemia Maternal Grandmother    • Cancer Maternal Grandmother    • Cancer Maternal Grandfather    • Parkinsonism Paternal Grandfather          SOCIAL HISTORY  Social History     Socioeconomic History   • Marital status: Single   Tobacco Use   • Smoking status: Never   • Smokeless tobacco: Never   Vaping Use   • Vaping Use: Never used   Substance and Sexual Activity   • Alcohol use: Yes   • Drug use: No   • Sexual activity: Defer     Birth control/protection: Injection          ALLERGIES  Doxycycline        REVIEW OF SYSTEMS  Review of Systems       All systems reviewed and negative except for those discussed in HPI.       PHYSICAL EXAM    I have reviewed the triage vital signs and nursing notes.    ED Triage Vitals   Temp Heart Rate Resp BP SpO2   11/07/22 2009 11/07/22 2009 11/07/22 2009 11/07/22 2009 11/07/22 2009   98.5 °F (36.9 °C) 103 18 119/84 98 %      Temp src Heart Rate Source Patient Position BP Location FiO2 (%)   11/07/22 2009 11/07/22 2009 11/08/22 0019 11/08/22 0019 --   Oral Monitor Sitting Right arm        Physical Exam  GENERAL:   Appears pleasant, nontoxic but a little nauseated/uncomfortable  HENT: Nares patent.  Moist mucous membranes  EYES: No scleral icterus.  CV: Regular rhythm, regular rate.  No murmurs gallops rubs.  RESPIRATORY: Normal effort.  No audible wheezes, rales or rhonchi.  Clear to auscultation  ABDOMEN: Soft, tender to palpation in the epigastric region as well as both left lower quadrant and right lower quadrant regions.  Definitely no guarding rebound or rigidity.  No hepatosplenomegaly or masses.  Bowel sounds are within normal limits.  MUSCULOSKELETAL: No deformities.   NEURO: Alert, moves all extremities, follows commands.  SKIN: Warm, dry, no rash visualized.        LAB RESULTS  Recent Results (from the past 24 hour(s))   Comprehensive Metabolic Panel    Collection Time: 11/07/22  8:48 PM    Specimen: Blood   Result Value Ref Range    Glucose 85 65 - 99 mg/dL    BUN 9 6 - 20 mg/dL    Creatinine 0.81 0.57 - 1.00 mg/dL    Sodium 139 136 - 145 mmol/L    Potassium 4.1 3.5 - 5.2 mmol/L    Chloride 107 98 - 107 mmol/L    CO2 23.0 22.0 - 29.0 mmol/L    Calcium 8.7 8.6 - 10.5 mg/dL    Total Protein 7.3 6.0 - 8.5 g/dL    Albumin 4.50 3.50 - 5.20 g/dL    ALT (SGPT) 13 1 - 33 U/L    AST (SGOT) 36 (H) 1 - 32 U/L    Alkaline Phosphatase 74 39 - 117 U/L    Total Bilirubin 0.4 0.0 - 1.2 mg/dL    Globulin 2.8 gm/dL    A/G Ratio 1.6 g/dL    BUN/Creatinine  Ratio 11.1 7.0 - 25.0    Anion Gap 9.0 5.0 - 15.0 mmol/L    eGFR 104.8 >60.0 mL/min/1.73   Lipase    Collection Time: 11/07/22  8:48 PM    Specimen: Blood   Result Value Ref Range    Lipase 25 13 - 60 U/L   Green Top (Gel)    Collection Time: 11/07/22  8:48 PM   Result Value Ref Range    Extra Tube Hold for add-ons.    Lavender Top    Collection Time: 11/07/22  8:48 PM   Result Value Ref Range    Extra Tube hold for add-on    Gold Top - SST    Collection Time: 11/07/22  8:48 PM   Result Value Ref Range    Extra Tube Hold for add-ons.    Light Blue Top    Collection Time: 11/07/22  8:48 PM   Result Value Ref Range    Extra Tube Hold for add-ons.    CBC Auto Differential    Collection Time: 11/07/22  8:48 PM    Specimen: Blood   Result Value Ref Range    WBC 6.16 3.40 - 10.80 10*3/mm3    RBC 4.71 3.77 - 5.28 10*6/mm3    Hemoglobin 14.0 12.0 - 15.9 g/dL    Hematocrit 43.4 34.0 - 46.6 %    MCV 92.1 79.0 - 97.0 fL    MCH 29.7 26.6 - 33.0 pg    MCHC 32.3 31.5 - 35.7 g/dL    RDW 12.7 12.3 - 15.4 %    RDW-SD 43.5 37.0 - 54.0 fl    MPV 10.2 6.0 - 12.0 fL    Platelets 230 140 - 450 10*3/mm3    Neutrophil % 66.7 42.7 - 76.0 %    Lymphocyte % 23.1 19.6 - 45.3 %    Monocyte % 8.9 5.0 - 12.0 %    Eosinophil % 0.8 0.3 - 6.2 %    Basophil % 0.3 0.0 - 1.5 %    Immature Grans % 0.2 0.0 - 0.5 %    Neutrophils, Absolute 4.11 1.70 - 7.00 10*3/mm3    Lymphocytes, Absolute 1.42 0.70 - 3.10 10*3/mm3    Monocytes, Absolute 0.55 0.10 - 0.90 10*3/mm3    Eosinophils, Absolute 0.05 0.00 - 0.40 10*3/mm3    Basophils, Absolute 0.02 0.00 - 0.20 10*3/mm3    Immature Grans, Absolute 0.01 0.00 - 0.05 10*3/mm3    nRBC 0.0 0.0 - 0.2 /100 WBC   ECG 12 Lead Other; Dizziness    Collection Time: 11/07/22  8:49 PM   Result Value Ref Range    QT Interval 336 ms    QTC Interval 440 ms   Urinalysis With Microscopic If Indicated (No Culture) - Urine, Clean Catch    Collection Time: 11/07/22  8:53 PM    Specimen: Urine, Clean Catch   Result Value Ref Range     Color, UA Yellow Yellow, Straw    Appearance, UA Cloudy (A) Clear    pH, UA <=5.0 5.0 - 8.0    Specific Gravity, UA 1.028 1.001 - 1.030    Glucose, UA Negative Negative    Ketones, UA Trace (A) Negative    Bilirubin, UA Negative Negative    Blood, UA Negative Negative    Protein, UA Negative Negative    Leuk Esterase, UA Negative Negative    Nitrite, UA Negative Negative    Urobilinogen, UA 1.0 E.U./dL 0.2 - 1.0 E.U./dL   Urinalysis, Microscopic Only - Urine, Clean Catch    Collection Time: 11/07/22  8:53 PM    Specimen: Urine, Clean Catch   Result Value Ref Range    RBC, UA 3-6 (A) None Seen, 0-2 /HPF    WBC, UA 0-2 None Seen, 0-2 /HPF    Bacteria, UA None Seen None Seen, Trace /HPF    Squamous Epithelial Cells, UA 3-6 (A) None Seen, 0-2 /HPF    Hyaline Casts, UA 0-6 0 - 6 /LPF    Methodology Automated Microscopy    POC Urine Pregnancy    Collection Time: 11/07/22  9:20 PM    Specimen: Urine   Result Value Ref Range    HCG, Urine, QL Negative     Lot Number [id number]     Internal Positive Control Passed     Internal Negative Control Passed     Expiration Date 2,023,130        If labs were ordered, I independently reviewed the results.        RADIOLOGY  No Radiology Exams Resulted Within Past 24 Hours           PROCEDURES    Procedures    ECG 12 Lead Other; Dizziness   Preliminary Result   Test Reason : Other~   Blood Pressure :   */*   mmHG   Vent. Rate : 103 BPM     Atrial Rate : 103 BPM      P-R Int : 134 ms          QRS Dur :  82 ms       QT Int : 336 ms       P-R-T Axes :  73  74  42 degrees      QTc Int : 440 ms      ** Poor data quality, interpretation may be adversely affected   Sinus tachycardia   Otherwise normal ECG   When compared with ECG of 10-SEP-2021 17:10,   Nonspecific T wave abnormality now evident in Anterior leads      Referred By: ERMD           Confirmed By:           MEDICATIONS GIVEN IN ER    Medications   Sodium Chloride (PF) 0.9 % 10 mL (has no administration in time range)   ondansetron  "ODT (ZOFRAN-ODT) disintegrating tablet 4 mg (has no administration in time range)         PROGRESS, DATA ANALYSIS, CONSULTS, AND MEDICAL DECISION MAKING    All labs have been independently reviewed by me.  All radiology studies have been reviewed by me and the radiologist dictating the report.   EKG's have been independently viewed and interpreted by me.      Differential diagnoses: Viral gastroenteritis versus early appendicitis, colitis, Crohn's, etc.      ED Course as of 11/08/22 0029 Tue Nov 08, 2022   0025 The patient is still in our lobby.  She has been here for over 4 hours and we have been unable to move her back to the main department secondary to a very large number of patients being seen both in the main department as well as in the lobby.  The patient now reports that she needs to leave because of a \"family emergency\".  I spoke with her about the possibility of early appendicitis and other more serious underlying etiologies.  She verbalizes understanding of this and knows that I cannot perform a complete evaluation without CT scanning of the abdomen pelvis which she reports she is unable to stay for.  We are administering Zofran orally.  I will send her prescription for Zofran to her pharmacy.  I have encouraged her to come back in the morning if she continues to have any symptoms.  She again verbalized understanding and willingness to do so. [MS]      ED Course User Index  [MS] Bay Monte MD             AS OF 00:29 EST VITALS:    BP - 137/95  HR - 101  TEMP - 98.5 °F (36.9 °C) (Oral)  O2 SATS - 99%                  DIAGNOSIS  Final diagnoses:   Nonspecific abdominal pain   Nausea and vomiting, unspecified vomiting type         DISPOSITION  DISCHARGE    Patient discharged in stable condition.    Reviewed implications of results, diagnosis, meds, responsibility to follow up, warning signs and symptoms of possible worsening, potential complications and reasons to return to ER.    Patient/Family " voiced understanding of above instructions.    Discussed plan for discharge, as there is no emergent indication for admission.  Pt/family is agreeable and understands need for follow up and possible repeat testing.  Pt/family is aware that discharge does not mean that nothing is wrong but that it indicates no emergency is currently present that requires admission and they must continue care with follow-up as given below or with a physician of their choice.     FOLLOW-UP  Rizwana Odonnell R, APRN  [address]  [address]  [place] [id number]  [phone]    In 1 day      [facility] Emergency Department  [address] [facility] [address] [phone]    IF YOU HAVE ANY CONCERN OF WORSENING CONDITION         Medication List      New Prescriptions    ondansetron ODT 4 MG disintegrating tablet  Commonly known as: ZOFRAN-ODT  Place 1 tablet on the tongue Every 6 (Six) Hours As Needed for Nausea or Vomiting. As needed for nausea        Stop    albuterol sulfate  (90 Base) MCG/ACT inhaler  Commonly known as: PROVENTIL HFA;VENTOLIN HFA;PROAIR HFA     fluticasone 50 MCG/ACT nasal spray  Commonly known as: Flonase     Mirena (52 MG) 20 MCG/DAY intrauterine device IUD  Generic drug: Levonorgestrel           Where to Get Your Medications      These medications were sent to [id number] - [place], [place] - [address] AT [facility] [address] - [phone]  - [phone] FX  [address], [facility] [address]    Phone: [phone]   · ondansetron ODT 4 MG disintegrating tablet                  Bay Monte MD  11/10/22 5733

## 2022-11-09 ENCOUNTER — APPOINTMENT (OUTPATIENT)
Dept: ULTRASOUND IMAGING | Facility: HOSPITAL | Age: 22
End: 2022-11-09

## 2022-11-09 ENCOUNTER — READMISSION MANAGEMENT (OUTPATIENT)
Dept: CALL CENTER | Facility: HOSPITAL | Age: 22
End: 2022-11-09

## 2022-11-09 ENCOUNTER — APPOINTMENT (OUTPATIENT)
Dept: GENERAL RADIOLOGY | Facility: HOSPITAL | Age: 22
End: 2022-11-09

## 2022-11-09 VITALS
RESPIRATION RATE: 18 BRPM | TEMPERATURE: 98.8 F | SYSTOLIC BLOOD PRESSURE: 110 MMHG | HEIGHT: 62 IN | OXYGEN SATURATION: 98 % | DIASTOLIC BLOOD PRESSURE: 73 MMHG | BODY MASS INDEX: 22.95 KG/M2 | HEART RATE: 92 BPM | WEIGHT: 124.7 LBS

## 2022-11-09 LAB
ALBUMIN SERPL-MCNC: 3.4 G/DL (ref 3.5–5.2)
ALBUMIN/GLOB SERPL: 1.5 G/DL
ALP SERPL-CCNC: 60 U/L (ref 39–117)
ALT SERPL W P-5'-P-CCNC: 10 U/L (ref 1–33)
ANION GAP SERPL CALCULATED.3IONS-SCNC: 9 MMOL/L (ref 5–15)
AST SERPL-CCNC: 26 U/L (ref 1–32)
BILIRUB SERPL-MCNC: 0.2 MG/DL (ref 0–1.2)
BILIRUB UR QL STRIP: NEGATIVE
BUN SERPL-MCNC: 6 MG/DL (ref 6–20)
BUN/CREAT SERPL: 9.4 (ref 7–25)
CALCIUM SPEC-SCNC: 8.2 MG/DL (ref 8.6–10.5)
CHLORIDE SERPL-SCNC: 106 MMOL/L (ref 98–107)
CK MB SERPL-CCNC: <1 NG/ML
CK SERPL-CCNC: 585 U/L (ref 20–180)
CLARITY UR: CLEAR
CO2 SERPL-SCNC: 20 MMOL/L (ref 22–29)
COLOR UR: YELLOW
CREAT SERPL-MCNC: 0.64 MG/DL (ref 0.57–1)
D DIMER PPP FEU-MCNC: 0.29 MCGFEU/ML (ref 0.01–0.5)
DEPRECATED RDW RBC AUTO: 42.7 FL (ref 37–54)
EGFRCR SERPLBLD CKD-EPI 2021: 127.5 ML/MIN/1.73
ERYTHROCYTE [DISTWIDTH] IN BLOOD BY AUTOMATED COUNT: 12.6 % (ref 12.3–15.4)
GLOBULIN UR ELPH-MCNC: 2.2 GM/DL
GLUCOSE SERPL-MCNC: 87 MG/DL (ref 65–99)
GLUCOSE UR STRIP-MCNC: NEGATIVE MG/DL
HCT VFR BLD AUTO: 35.3 % (ref 34–46.6)
HGB BLD-MCNC: 11.5 G/DL (ref 12–15.9)
HGB UR QL STRIP.AUTO: NEGATIVE
KETONES UR QL STRIP: ABNORMAL
LEUKOCYTE ESTERASE UR QL STRIP.AUTO: NEGATIVE
MAGNESIUM SERPL-MCNC: 1.7 MG/DL (ref 1.6–2.6)
MCH RBC QN AUTO: 30.1 PG (ref 26.6–33)
MCHC RBC AUTO-ENTMCNC: 32.6 G/DL (ref 31.5–35.7)
MCV RBC AUTO: 92.4 FL (ref 79–97)
NITRITE UR QL STRIP: NEGATIVE
OSMOLALITY SERPL: 283 MOSM/KG (ref 275–295)
PH UR STRIP.AUTO: 5.5 [PH] (ref 5–8)
PLATELET # BLD AUTO: 181 10*3/MM3 (ref 140–450)
PMV BLD AUTO: 10.5 FL (ref 6–12)
POTASSIUM SERPL-SCNC: 3.7 MMOL/L (ref 3.5–5.2)
PROT SERPL-MCNC: 5.6 G/DL (ref 6–8.5)
PROT UR QL STRIP: NEGATIVE
QT INTERVAL: 334 MS
QTC INTERVAL: 424 MS
RBC # BLD AUTO: 3.82 10*6/MM3 (ref 3.77–5.28)
SODIUM SERPL-SCNC: 135 MMOL/L (ref 136–145)
SP GR UR STRIP: 1.01 (ref 1–1.03)
TROPONIN T SERPL-MCNC: <0.01 NG/ML (ref 0–0.03)
TSH SERPL DL<=0.05 MIU/L-ACNC: 0.84 UIU/ML (ref 0.27–4.2)
UROBILINOGEN UR QL STRIP: ABNORMAL
WBC NRBC COR # BLD: 5.08 10*3/MM3 (ref 3.4–10.8)

## 2022-11-09 PROCEDURE — 81003 URINALYSIS AUTO W/O SCOPE: CPT | Performed by: NURSE PRACTITIONER

## 2022-11-09 PROCEDURE — 85379 FIBRIN DEGRADATION QUANT: CPT | Performed by: NURSE PRACTITIONER

## 2022-11-09 PROCEDURE — 80053 COMPREHEN METABOLIC PANEL: CPT | Performed by: NURSE PRACTITIONER

## 2022-11-09 PROCEDURE — 85027 COMPLETE CBC AUTOMATED: CPT | Performed by: NURSE PRACTITIONER

## 2022-11-09 PROCEDURE — 82550 ASSAY OF CK (CPK): CPT | Performed by: HOSPITALIST

## 2022-11-09 PROCEDURE — 93005 ELECTROCARDIOGRAM TRACING: CPT | Performed by: NURSE PRACTITIONER

## 2022-11-09 PROCEDURE — 84484 ASSAY OF TROPONIN QUANT: CPT | Performed by: NURSE PRACTITIONER

## 2022-11-09 PROCEDURE — G0378 HOSPITAL OBSERVATION PER HR: HCPCS

## 2022-11-09 PROCEDURE — 93010 ELECTROCARDIOGRAM REPORT: CPT | Performed by: INTERNAL MEDICINE

## 2022-11-09 PROCEDURE — 83735 ASSAY OF MAGNESIUM: CPT | Performed by: NURSE PRACTITIONER

## 2022-11-09 PROCEDURE — 99217 PR OBSERVATION CARE DISCHARGE MANAGEMENT: CPT | Performed by: HOSPITALIST

## 2022-11-09 PROCEDURE — 84443 ASSAY THYROID STIM HORMONE: CPT | Performed by: NURSE PRACTITIONER

## 2022-11-09 PROCEDURE — 76705 ECHO EXAM OF ABDOMEN: CPT

## 2022-11-09 PROCEDURE — 71045 X-RAY EXAM CHEST 1 VIEW: CPT

## 2022-11-09 PROCEDURE — 83930 ASSAY OF BLOOD OSMOLALITY: CPT | Performed by: HOSPITALIST

## 2022-11-09 PROCEDURE — 82553 CREATINE MB FRACTION: CPT | Performed by: HOSPITALIST

## 2022-11-09 RX ORDER — ACETAMINOPHEN 325 MG/1
650 TABLET ORAL EVERY 6 HOURS PRN
Status: DISCONTINUED | OUTPATIENT
Start: 2022-11-09 | End: 2022-11-09 | Stop reason: HOSPADM

## 2022-11-09 RX ADMIN — ACETAMINOPHEN 650 MG: 325 TABLET ORAL at 03:38

## 2022-11-09 RX ADMIN — Medication 10 ML: at 08:01

## 2022-11-09 NOTE — PLAN OF CARE
Goal Outcome Evaluation:  Plan of Care Reviewed With: patient        Progress: no change  Outcome Evaluation: Pt is A&OX4 on RA independent  temp is elavated BP/HR/R/ WNL. pt slept trought close to 3 the woke up  complained of tighting in her chest, tingling on hands, elevated temp. pt stated she felt weird nurse practitioning notified see new orders, and will cx pt.

## 2022-11-09 NOTE — SIGNIFICANT NOTE
"Called per nursing secondary to pt complaining of chest \"heaviness\" and tingling in both hands. Mild tachycardia, low grade temp noted.   Labs ordered. PT has received 4L IVF bolus throughout day yesterday, will repeat CXR. EKG pending. Tylenol ordered. UA appears contaminated, will attempt repeat clean catch. No urinary symptoms reported, good urinary output. Continues to have nausea.     Addendum:   Pt evaluated. Continues to have chest pressure right upper chest. Nausea improved but notes abdominal pain. Will add abdominal ultrasound for gallbladder. Labs still pending. Continues to have low grade elevated temp with mild tachycardia. EKG reviewed.   Discussed with on call attending. Pt to be transferred to tele.   "

## 2022-11-09 NOTE — OUTREACH NOTE
Prep Survey    Flowsheet Row Responses   [facility] patient discharged from? [place]   Is LACE score < 7 ? Yes   Emergency Room discharge w/ pulse ox? No   Eligibility [facility]   Date of Admission 11/08/22   Date of Discharge 11/09/22   Discharge Disposition Home or Self Care   Discharge diagnosis Non-traumatic rhabdomyolysis,    Acute abdominal pain,   Nausea and vomiting   Does the patient have one of the following disease processes/diagnoses(primary or secondary)? Other   Does the patient have Home health ordered? No   Is there a DME ordered? No   Prep survey completed? Yes          AMARIS LIN - Registered Nurse

## 2022-11-09 NOTE — CASE MANAGEMENT/SOCIAL WORK
Discharge Planning Assessment  [facility]     Patient Name: Oksana Aguirre  MRN: [id number]  Today's Date: 11/9/2022    Admit Date: 11/8/2022    Plan: discharge plan   Discharge Needs Assessment     Row Name 11/09/22 1224       Living Environment    People in Home other (see comments)  Roommate-Ki Christopher    Primary Care Provided by self    Provides Primary Care For no one    Quality of Family Relationships unable to assess    Able to Return to Prior Arrangements yes    Living Arrangement Comments I spoke with patient at bedside regarding discharge plan. [facility] with roomateIvon Christopher       Transition Planning    Patient/Family Anticipates Transition to home with family    Patient/Family Anticipated Services at Transition     Transportation Anticipated family or friend will provide       Discharge Needs Assessment    Readmission Within the Last 30 Days no previous admission in last 30 days    Equipment Currently Used at Home none    Concerns to be Addressed discharge planning    Anticipated Changes Related to Illness none    Equipment Needed After Discharge none    Discharge Coordination/Progress Pt reports she has [employer] insurance but does not have her insurance card with her.  Pt is here with recurrent vomiting and plan is home at discharge.  Pt states she has transportation at discharge and denies discharge needs. CM will cont to follow               Discharge Plan     Row Name 11/09/22 1228       Plan    Plan discharge plan    Plan Comments Pt's plan is home at discharge and denies discharge needs. She stated she had someone that can provide transportation home.    Final Discharge Disposition Code 01 - home or self-care              Continued Care and Services - Admitted Since 11/8/2022    Coordination has not been started for this encounter.       Expected Discharge Date and Time     Expected Discharge Date Expected Discharge Time    Nov 9, 2022           Demographic Summary     Row Name 11/09/22 1222       General Information    General Information Comments Pt's PCP is Rizwana Odonnell       Contact Information    Permission Granted to Share Info With     Contact Information Obtained for     Contact Information Comments Eliza Watts(guardian) [phone]               Functional Status    No documentation.                Psychosocial    No documentation.                Abuse/Neglect    No documentation.                Legal    No documentation.                Substance Abuse    No documentation.                Patient Forms    No documentation.                   Jeana Lugo RN

## 2022-11-10 ENCOUNTER — TRANSITIONAL CARE MANAGEMENT TELEPHONE ENCOUNTER (OUTPATIENT)
Dept: CALL CENTER | Facility: HOSPITAL | Age: 22
End: 2022-11-10

## 2022-11-10 LAB
QT INTERVAL: 336 MS
QTC INTERVAL: 440 MS

## 2022-11-10 NOTE — OUTREACH NOTE
Call Center TCM Note    Flowsheet Row Responses   [facility] patient discharged from? [place]   Does the patient have one of the following disease processes/diagnoses(primary or secondary)? Other   TCM attempt successful? Yes   Call start time 1131   Call end time 1131   Meds reviewed with patient/caregiver? Yes   Is the patient having any side effects they believe may be caused by any medication additions or changes? No   Does the patient have all medications ordered at discharge? N/A   Is the patient taking all medications as directed (includes completed medication regime)? Yes   Does the patient have an appointment with their PCP within 7 days of discharge? No   Nursing Interventions Patient declined scheduling/rescheduling appointment at this time, Routed TCM call to PCP office, PCP office requested to make appointment - message sent   Has home health visited the patient within 72 hours of discharge? N/A   Psychosocial issues? No   Did the patient receive a copy of their discharge instructions? Yes   Nursing interventions Reviewed instructions with patient   What is the patient's perception of their health status since discharge? Improving   Is the patient/caregiver able to teach back signs and symptoms related to disease process for when to call PCP? Yes   Is the patient/caregiver able to teach back signs and symptoms related to disease process for when to call 911? Yes   Is the patient/caregiver able to teach back the hierarchy of who to call/visit for symptoms/problems? PCP, Specialist, Home health nurse, Urgent Care, ED, 911 Yes   If the patient is a current smoker, are they able to teach back resources for cessation? Not a smoker   TCM call completed? Yes   Call end time 1131          Emerald Singh RN    11/10/2022, 11:31 EST

## 2022-11-11 ENCOUNTER — OFFICE VISIT (OUTPATIENT)
Dept: CARDIOLOGY | Facility: HOSPITAL | Age: 22
End: 2022-11-11

## 2022-11-11 ENCOUNTER — HOSPITAL ENCOUNTER (OUTPATIENT)
Dept: CARDIOLOGY | Facility: HOSPITAL | Age: 22
Discharge: HOME | End: 2022-11-11

## 2022-11-11 VITALS
HEART RATE: 89 BPM | DIASTOLIC BLOOD PRESSURE: 75 MMHG | WEIGHT: 120 LBS | BODY MASS INDEX: 22.08 KG/M2 | HEIGHT: 62 IN | OXYGEN SATURATION: 100 % | TEMPERATURE: 96.5 F | SYSTOLIC BLOOD PRESSURE: 120 MMHG | RESPIRATION RATE: 18 BRPM

## 2022-11-11 DIAGNOSIS — R00.0 TACHYCARDIA: Primary | ICD-10-CM

## 2022-11-11 DIAGNOSIS — R53.83 OTHER FATIGUE: ICD-10-CM

## 2022-11-11 DIAGNOSIS — R10.13 DYSPEPSIA: ICD-10-CM

## 2022-11-11 DIAGNOSIS — R00.0 TACHYCARDIA: ICD-10-CM

## 2022-11-11 DIAGNOSIS — R00.2 PALPITATIONS: ICD-10-CM

## 2022-11-11 PROCEDURE — 99214 OFFICE O/P EST MOD 30 MIN: CPT | Performed by: NURSE PRACTITIONER

## 2022-11-11 PROCEDURE — 93246 EXT ECG>7D<15D RECORDING: CPT

## 2022-11-11 RX ORDER — FAMOTIDINE 20 MG/1
20 TABLET, FILM COATED ORAL 2 TIMES DAILY
Qty: 30 TABLET | Refills: 0 | Status: SHIPPED | OUTPATIENT
Start: 2022-11-11

## 2022-11-11 NOTE — PROGRESS NOTES
[facility] Heart Monitor Documentation    Oksana Aguirre  2000  [id number]  11/11/22      [] ZIO XT Patch  Model [id number] Prescribed for N/A Days    · Serial Number: (N + 9 Digits) N   · Apply-By Date on Box:   · USPS Tracking Number:   · USPS Tracking        [] [employer] MINI PLUS Mobile Cardiac Telemetry  Model BGMINIPLUS Prescribed for N/A Days    · Serial Number: (BGM + 7 Digits) BGM  · Shipped-By Date on Box:   · UPS Tracking Number: [id number]  · UPS Tracking      [] [employer] MINI Holter Monitor  Model BGMINIEL Prescribed for 14 Days    · Serial Number: (7 Digits) [id number]  · Shipped-By Date on Box: 11/10/22  · UPS Tracking Number: [id number]  · UPS Tracking        This monitor was applied to the patient's chest and checked for proper functioning.  Ms. Oksana Aguirre was instructed in the proper use of this monitor.  She was given the opportunity to ask questions and left the office with the device 's instruction manual.    Juan C Rios MA, 15:24 EST, 11/11/22                  [facility] 8[facility]

## 2022-11-11 NOTE — PROGRESS NOTES
"[facility] Heart and Vascular    Chief Complaint  Establish Care and Atrial Fibrillation    Subjective    History of Present Illness {CC  Problem List  Visit  Diagnosis   Encounters  Notes  Medications  Labs  Result Review Imaging  Media :23}     Oksana Aguirre presents to [facility] CARDIOLOGY for   History of Present Illness     22-year-old female with intermittent elevated blood pressure without treatment with hypertension medications, migraines, allergies.  Patient presented to [facility] on 11/8/2022 with nausea, vomiting dehydration.  Received volume resuscitation.    Pt reports since ED visit fatigue, dyspnea with activity.  Rare chest discomfort. Mild dizziness, near syncope, syncope.      Intermittent dyspepsia      Objective     Vital Signs:   Vitals:    11/11/22 1248 11/11/22 1250 11/11/22 1251   BP: 113/73 109/66 120/75   BP Location: Left arm Left arm Left arm   Patient Position: Sitting Sitting Standing   Cuff Size: Adult Adult Adult   Pulse: 93 93 89   Resp: 18     Temp: 96.5 °F (35.8 °C)     TempSrc: Temporal     SpO2: 100%     Weight: 54.4 kg (120 lb)     Height: 157.5 cm (62\")       Body mass index is 21.95 kg/m².  Physical Exam  Vitals reviewed.   Constitutional:       General: She is not in acute distress.     Appearance: Normal appearance.   Cardiovascular:      Rate and Rhythm: Normal rate and regular rhythm.      Pulses:           Radial pulses are 2+ on the right side.        Dorsalis pedis pulses are 2+ on the right side.        Posterior tibial pulses are 2+ on the right side.      Heart sounds: Normal heart sounds.   Pulmonary:      Effort: Pulmonary effort is normal.      Breath sounds: Normal breath sounds.   Musculoskeletal:      Right lower leg: No edema.      Left lower leg: No edema.   Skin:     General: Skin is warm and dry.   Neurological:      Mental Status: She is alert.   Psychiatric:         " Mood and Affect: Mood normal.         Behavior: Behavior is cooperative.              Result Review  Data Reviewed:{ Labs  Result Review  Imaging  Med Tab  Media :23}     EKG 11/8/2022: Sinus tachycardia 106 bpm    Admission on 11/08/2022, Discharged on 11/09/2022   Component Date Value Ref Range Status   • Glucose 11/08/2022 83  65 - 99 mg/dL Final   • BUN 11/08/2022 11  6 - 20 mg/dL Final   • Creatinine 11/08/2022 0.75  0.57 - 1.00 mg/dL Final   • Sodium 11/08/2022 134 (L)  136 - 145 mmol/L Final   • Potassium 11/08/2022 4.0  3.5 - 5.2 mmol/L Final    Slight hemolysis detected by analyzer. Results may be affected.   • Chloride 11/08/2022 103  98 - 107 mmol/L Final   • CO2 11/08/2022 20.0 (L)  22.0 - 29.0 mmol/L Final   • Calcium 11/08/2022 8.5 (L)  8.6 - 10.5 mg/dL Final   • Total Protein 11/08/2022 7.1  6.0 - 8.5 g/dL Final   • Albumin 11/08/2022 4.10  3.50 - 5.20 g/dL Final   • ALT (SGPT) 11/08/2022 14  1 - 33 U/L Final   • AST (SGOT) 11/08/2022 34 (H)  1 - 32 U/L Final   • Alkaline Phosphatase 11/08/2022 75  39 - 117 U/L Final   • Total Bilirubin 11/08/2022 0.5  0.0 - 1.2 mg/dL Final   • Globulin 11/08/2022 3.0  gm/dL Final    Calculated Result   • A/G Ratio 11/08/2022 1.4  g/dL Final   • BUN/Creatinine Ratio 11/08/2022 14.7  7.0 - 25.0 Final   • Anion Gap 11/08/2022 11.0  5.0 - 15.0 mmol/L Final   • eGFR 11/08/2022 114.9  >60.0 mL/min/1.73 Final    National Kidney Foundation and American Society of Nephrology (ASN) Task Force recommended calculation based on the Chronic Kidney Disease Epidemiology Collaboration (CKD-EPI) equation refit without adjustment for race.   • Lipase 11/08/2022 34  13 - 60 U/L Final   • Color, UA 11/08/2022 Yellow  Yellow, Straw Final   • Appearance, UA 11/08/2022 Cloudy (A)  Clear Final   • pH, UA 11/08/2022 5.5  5.0 - 8.0 Final   • Specific Gravity, UA 11/08/2022 >=1.030  1.001 - 1.030 Final   • Glucose, UA 11/08/2022 Negative  Negative Final   • Ketones, UA 11/08/2022 15 mg/dL  (1+) (A)  Negative Final   • Bilirubin, UA 11/08/2022 Negative  Negative Final   • Blood, UA 11/08/2022 Trace (A)  Negative Final   • Protein, UA 11/08/2022 Trace (A)  Negative Final   • Leuk Esterase, UA 11/08/2022 Trace (A)  Negative Final   • Nitrite, UA 11/08/2022 Negative  Negative Final   • Urobilinogen, UA 11/08/2022 1.0 E.U./dL  0.2 - 1.0 E.U./dL Final   • HCG, Urine, QL 11/08/2022 Negative  Negative Final   • Lot Number 11/08/2022 [id number]   Final   • Internal Positive Control 11/08/2022 Passed  Positive, Passed Final   • Internal Negative Control 11/08/2022 Passed  Negative, Passed Final   • Expiration Date 11/08/2022 20223-11-30   Final   • QT Interval 11/08/2022 342  ms Final   • QTC Interval 11/08/2022 454  ms Final   • Extra Tube 11/08/2022 Hold for add-ons.   Final    Auto resulted.   • Extra Tube 11/08/2022 hold for add-on   Final    Auto resulted   • Extra Tube 11/08/2022 Hold for add-ons.   Final    Auto resulted.   • Extra Tube 11/08/2022 Hold for add-ons.   Final    Auto resulted.   • Extra Tube 11/08/2022 Hold for add-ons.   Final    Auto resulted   • WBC 11/08/2022 4.61  3.40 - 10.80 10*3/mm3 Final   • RBC 11/08/2022 4.69  3.77 - 5.28 10*6/mm3 Final   • Hemoglobin 11/08/2022 14.1  12.0 - 15.9 g/dL Final   • Hematocrit 11/08/2022 43.1  34.0 - 46.6 % Final   • MCV 11/08/2022 91.9  79.0 - 97.0 fL Final   • MCH 11/08/2022 30.1  26.6 - 33.0 pg Final   • MCHC 11/08/2022 32.7  31.5 - 35.7 g/dL Final   • RDW 11/08/2022 12.9  12.3 - 15.4 % Final   • RDW-SD 11/08/2022 43.1  37.0 - 54.0 fl Final   • MPV 11/08/2022 10.2  6.0 - 12.0 fL Final   • Platelets 11/08/2022 213  140 - 450 10*3/mm3 Final   • Neutrophil % 11/08/2022 75.6  42.7 - 76.0 % Final   • Lymphocyte % 11/08/2022 16.9 (L)  19.6 - 45.3 % Final   • Monocyte % 11/08/2022 6.5  5.0 - 12.0 % Final   • Eosinophil % 11/08/2022 0.2 (L)  0.3 - 6.2 % Final   • Basophil % 11/08/2022 0.4  0.0 - 1.5 % Final   • Immature Grans % 11/08/2022 0.4  0.0 - 0.5 %  Final   • Neutrophils, Absolute 11/08/2022 3.48  1.70 - 7.00 10*3/mm3 Final   • Lymphocytes, Absolute 11/08/2022 0.78  0.70 - 3.10 10*3/mm3 Final   • Monocytes, Absolute 11/08/2022 0.30  0.10 - 0.90 10*3/mm3 Final   • Eosinophils, Absolute 11/08/2022 0.01  0.00 - 0.40 10*3/mm3 Final   • Basophils, Absolute 11/08/2022 0.02  0.00 - 0.20 10*3/mm3 Final   • Immature Grans, Absolute 11/08/2022 0.02  0.00 - 0.05 10*3/mm3 Final   • nRBC 11/08/2022 0.0  0.0 - 0.2 /100 WBC Final   • RBC, UA 11/08/2022 7-12 (A)  None Seen, 0-2 /HPF Final   • WBC, UA 11/08/2022 13-20 (A)  None Seen, 0-2 /HPF Final   • Bacteria, UA 11/08/2022 1+ (A)  None Seen, Trace /HPF Final   • Squamous Epithelial Cells, UA 11/08/2022 31-50 (A)  None Seen, 0-2 /HPF Final   • Hyaline Casts, UA 11/08/2022 0-6  0 - 6 /LPF Final   • Mucus, UA 11/08/2022 Small/1+ (A)  None Seen, Trace /HPF Final   • Methodology 11/08/2022 Manual Light Microscopy   Final   • Creatine Kinase 11/08/2022 913 (H)  20 - 180 U/L Final   • COVID19 11/08/2022 Not Detected  Not Detected - Ref. Range Final   • Influenza A PCR 11/08/2022 Not Detected  Not Detected Final   • Influenza B PCR 11/08/2022 Not Detected  Not Detected Final   • Lactate 11/08/2022 0.8  0.5 - 2.0 mmol/L Final    Falsely depressed results may occur on samples drawn from patients receiving N-Acetylcysteine (NAC) or Metamizole.   • THC, Screen, Urine 11/08/2022 Positive (A)  Negative Final   • Phencyclidine (PCP), Urine 11/08/2022 Negative  Negative Final   • Cocaine Screen, Urine 11/08/2022 Negative  Negative Final   • Methamphetamine, Ur 11/08/2022 Negative  Negative Final   • Opiate Screen 11/08/2022 Negative  Negative Final   • Amphetamine Screen, Urine 11/08/2022 Negative  Negative Final   • Benzodiazepine Screen, Urine 11/08/2022 Negative  Negative Final   • Tricyclic Antidepressants Screen 11/08/2022 Negative  Negative Final   • Methadone Screen, Urine 11/08/2022 Negative  Negative Final   • Barbiturates Screen,  Urine 11/08/2022 Negative  Negative Final   • Oxycodone Screen, Urine 11/08/2022 Negative  Negative Final   • Propoxyphene Screen 11/08/2022 Negative  Negative Final   • Buprenorphine, Screen, Urine 11/08/2022 Negative  Negative Final   • Hepatitis B Surface Ag 11/08/2022 Non-Reactive  Non-Reactive Final   • Hep A IgM 11/08/2022 Non-Reactive  Non-Reactive Final   • Hep B C IgM 11/08/2022 Non-Reactive  Non-Reactive Final   • Hepatitis C Ab 11/08/2022 Non-Reactive  Non-Reactive Final   • CKMB 11/09/2022 <1.00  <=5.30 ng/mL Final   • Creatine Kinase 11/09/2022 585 (H)  20 - 180 U/L Final   • Osmolality 11/09/2022 283  275 - 295 mOsm/kg Final   • Color, UA 11/09/2022 Yellow  Yellow, Straw Final   • Appearance, UA 11/09/2022 Clear  Clear Final   • pH, UA 11/09/2022 5.5  5.0 - 8.0 Final   • Specific Gravity, UA 11/09/2022 1.008  1.001 - 1.030 Final   • Glucose, UA 11/09/2022 Negative  Negative Final   • Ketones, UA 11/09/2022 Trace (A)  Negative Final   • Bilirubin, UA 11/09/2022 Negative  Negative Final   • Blood, UA 11/09/2022 Negative  Negative Final   • Protein, UA 11/09/2022 Negative  Negative Final   • Leuk Esterase, UA 11/09/2022 Negative  Negative Final   • Nitrite, UA 11/09/2022 Negative  Negative Final   • Urobilinogen, UA 11/09/2022 0.2 E.U./dL  0.2 - 1.0 E.U./dL Final   • WBC 11/09/2022 5.08  3.40 - 10.80 10*3/mm3 Final   • RBC 11/09/2022 3.82  3.77 - 5.28 10*6/mm3 Final   • Hemoglobin 11/09/2022 11.5 (L)  12.0 - 15.9 g/dL Final   • Hematocrit 11/09/2022 35.3  34.0 - 46.6 % Final   • MCV 11/09/2022 92.4  79.0 - 97.0 fL Final   • MCH 11/09/2022 30.1  26.6 - 33.0 pg Final   • MCHC 11/09/2022 32.6  31.5 - 35.7 g/dL Final   • RDW 11/09/2022 12.6  12.3 - 15.4 % Final   • RDW-SD 11/09/2022 42.7  37.0 - 54.0 fl Final   • MPV 11/09/2022 10.5  6.0 - 12.0 fL Final   • Platelets 11/09/2022 181  140 - 450 10*3/mm3 Final   • Glucose 11/09/2022 87  65 - 99 mg/dL Final   • BUN 11/09/2022 6  6 - 20 mg/dL Final   • Creatinine  11/09/2022 0.64  0.57 - 1.00 mg/dL Final   • Sodium 11/09/2022 135 (L)  136 - 145 mmol/L Final   • Potassium 11/09/2022 3.7  3.5 - 5.2 mmol/L Final   • Chloride 11/09/2022 106  98 - 107 mmol/L Final   • CO2 11/09/2022 20.0 (L)  22.0 - 29.0 mmol/L Final   • Calcium 11/09/2022 8.2 (L)  8.6 - 10.5 mg/dL Final   • Total Protein 11/09/2022 5.6 (L)  6.0 - 8.5 g/dL Final   • Albumin 11/09/2022 3.40 (L)  3.50 - 5.20 g/dL Final   • ALT (SGPT) 11/09/2022 10  1 - 33 U/L Final   • AST (SGOT) 11/09/2022 26  1 - 32 U/L Final   • Alkaline Phosphatase 11/09/2022 60  39 - 117 U/L Final   • Total Bilirubin 11/09/2022 0.2  0.0 - 1.2 mg/dL Final   • Globulin 11/09/2022 2.2  gm/dL Final    Calculated Result   • A/G Ratio 11/09/2022 1.5  g/dL Final   • BUN/Creatinine Ratio 11/09/2022 9.4  7.0 - 25.0 Final   • Anion Gap 11/09/2022 9.0  5.0 - 15.0 mmol/L Final   • eGFR 11/09/2022 127.5  >60.0 mL/min/1.73 Final    National Kidney Foundation and American Society of Nephrology (ASN) Task Force recommended calculation based on the Chronic Kidney Disease Epidemiology Collaboration (CKD-EPI) equation refit without adjustment for race.   • TSH 11/09/2022 0.842  0.270 - 4.200 uIU/mL Final   • QT Interval 11/09/2022 334  ms Final   • QTC Interval 11/09/2022 424  ms Final   • Troponin T 11/09/2022 <0.010  0.000 - 0.030 ng/mL Final   • Magnesium 11/09/2022 1.7  1.6 - 2.6 mg/dL Final   • D-Dimer, Quantitative 11/09/2022 0.29  0.01 - 0.50 MCGFEU/mL Final                   Assessment and Plan {CC Problem List  Visit Diagnosis  ROS  Review (Popup)  Health Maintenance  Quality  BestPractice  Medications  SmartSets  SnapShot Encounters  Media :23}   1. Tachycardia    - Holter Monitor - 72 Hour Up To 15 Days; Future    2. Other fatigue    - Holter Monitor - 72 Hour Up To 15 Days; Future    3. Palpitations    - Holter Monitor - 72 Hour Up To 15 Days; Future    4. Dyspepsia    - famotidine (Pepcid) 20 MG tablet; Take 1 tablet by mouth 2 (Two)  Times a Day.  Dispense: 30 tablet; Refill: 0          Follow Up {Instructions Charge Capture  Follow-up Communications :23}   Return in about 6 weeks (around 12/23/2022) for Video visit.    Patient was given instructions and counseling regarding her condition or for health maintenance advice. Please see specific information pulled into the AVS if appropriate.  Patient was instructed to call the [facility] with any questions, concerns, or worsening symptoms.

## 2022-11-11 NOTE — DISCHARGE SUMMARY
"    [facility] Medicine Services  DISCHARGE SUMMARY    Patient Name: Oksana Aguirre  : 2000  MRN: [id number]    Date of Admission: 2022  8:16 AM  Date of Discharge:  2022 (Wednesday)  Primary Care Physician: Rizwana Odonnell APRN    Consults     No orders found from 10/10/2022 to 2022.          Hospital Course     Presenting Problem:     Severe Dehydration  Repeated Retching/Vomiting    Active Hospital Problems    Diagnosis  POA   • **Recurrent vomiting [R11.10]  Yes   • Non-traumatic rhabdomyolysis [M62.82]  Yes   • Dehydration [E86.0]  Yes   • Tachycardia [R00.0]  Yes   • Ketonuria [R82.4]  Yes   • Starvation ketoacidosis [T73.0XXA, E87.29]  Yes   • Acute constipation [K59.00]  Yes      Resolved Hospital Problems   No resolved problems to display.      Severe Dehydration    Hospital Course:  Oksana Aguirre is a 22 y.o. female with history of intermittent high blood pressure without a need for anti-hypertensive therapy, migraines, and allergies who presented to the ER with not feeling well and persistent nausea and vomiting and abdominal pain.  She was in the ER last night for the same but went home after midnight.    At the start of this process she was seen by REJI Gonzalez at the [facility] on  for Vomiting (nausea/vomiting/elevated blood pressure).  She was referred from [facility] to the [facility] ER on 2022.    She was seen in the Lobby it sounds like.   It was very busy and was in the lobby for 4 hours or more due to an extremely large volume of patients that night.  Eventually, she left for a \"family emergency\", but was instructed to come back to the ER if she did not improve.    She returned to the ER on the morning of  and had a work up with CT Scan of the Abdomen and Pelvis.  She was very dehydrated and received 2 L of NS in the ER and was still dehydrated on exam and received another 2 L of NS after admission.  It took 4 Liters just to " improve her blood pressure and heart rate.    She improved with mainly volume resuscitation and observation in the hospital.   The next day she felt improved and was hoping to go home.      Day of Discharge     HPI:     Feels much better.  No intractable vomiting today    Review of Systems    Gen- No fevers, chills  CV- No chest pain, palpitations  Resp- No cough, dyspnea  GI- No N/V/D, abd pain      Vital Signs:     Temp:   98.8 F       Pulse:  92        Blood Pressure:   110/73       On Room Air - 98 %    Physical Exam:    Constitutional: No acute distress, awake, alert  HENT: NCAT, dry tongue  Respiratory: Clear to auscultation bilaterally, respiratory effort normal   Cardiovascular: RRR, no murmurs, rubs, or gallops  Gastrointestinal: Positive bowel sounds, soft, mild generalized abdominal tenderness, more so in the low abdomen  Musculoskeletal: No bilateral ankle edema  Psychiatric: Appropriate affect, cooperative  Skin: No rashes    Pertinent  and/or Most Recent Results     LAB RESULTS:      Lab 11/09/22  0434 11/09/22  0404 11/08/22  0836 11/07/22 2048   WBC  --  5.08 4.61 6.16   HEMOGLOBIN  --  11.5* 14.1 14.0   HEMATOCRIT  --  35.3 43.1 43.4   PLATELETS  --  181 213 230   NEUTROS ABS  --   --  3.48 4.11   IMMATURE GRANS (ABS)  --   --  0.02 0.01   LYMPHS ABS  --   --  0.78 1.42   MONOS ABS  --   --  0.30 0.55   EOS ABS  --   --  0.01 0.05   MCV  --  92.4 91.9 92.1   LACTATE  --   --  0.8  --    D DIMER QUANT 0.29  --   --   --          Lab 11/09/22  0633 11/08/22  0836 11/07/22 2048   SODIUM 135* 134* 139   POTASSIUM 3.7 4.0 4.1   CHLORIDE 106 103 107   CO2 20.0* 20.0* 23.0   ANION GAP 9.0 11.0 9.0   BUN 6 11 9   CREATININE 0.64 0.75 0.81   EGFR 127.5 114.9 104.8   GLUCOSE 87 83 85   CALCIUM 8.2* 8.5* 8.7   MAGNESIUM 1.7  --   --    TSH 0.842  --   --          Lab 11/09/22  0633 11/08/22  0836 11/07/22  2048   TOTAL PROTEIN 5.6* 7.1 7.3   ALBUMIN 3.40* 4.10 4.50   GLOBULIN 2.2 3.0 2.8   ALT (SGPT) 10 14  13   AST (SGOT) 26 34* 36*   BILIRUBIN 0.2 0.5 0.4   ALK PHOS 60 75 74   LIPASE  --  34 25         Lab 11/09/22  0633   TROPONIN T <0.010                 Brief Urine Lab Results  (Last result in the past 365 days)      Color   Clarity   Blood   Leuk Est   Nitrite   Protein   CREAT   Urine HCG        11/09/22 1014 Yellow   Clear   Negative   Negative   Negative   Negative               Microbiology Results (last 10 days)     Procedure Component Value - Date/Time    COVID PRE-OP / PRE-PROCEDURE SCREENING ORDER (NO ISOLATION) - Swab, Nasopharynx [[id number]]  (Normal) Collected: 11/08/22 1050    Lab Status: Final result Specimen: Swab from Nasopharynx Updated: 11/08/22 1126    Narrative:      The following orders were created for panel order COVID PRE-OP / PRE-PROCEDURE SCREENING ORDER (NO ISOLATION) - Swab, Nasopharynx.  Procedure                               Abnormality         Status                     ---------                               -----------         ------                     COVID-19 and FLU A/B PCR...[[id number]]  Normal              Final result                 Please view results for these tests on the individual orders.    COVID-19 and FLU A/B PCR - Swab, Nasopharynx [[id number]]  (Normal) Collected: 11/08/22 1050    Lab Status: Final result Specimen: Swab from Nasopharynx Updated: 11/08/22 1126     COVID19 Not Detected     Influenza A PCR Not Detected     Influenza B PCR Not Detected    Narrative:      Fact sheet for providers: https://www.fda.gov/media/626308/download    Fact sheet for patients: https://www.fda.gov/media/835110/download    Test performed by PCR.          CT Abdomen Pelvis With Contrast    Result Date: 11/8/2022  DATE OF EXAM: 11/8/2022 9:59 AM  PROCEDURE: CT ABDOMEN PELVIS W CONTRAST-  INDICATIONS: diffuse abd pain  COMPARISON: 1/6/2019  TECHNIQUE: Routine transaxial slices were obtained through the abdomen and pelvis after the intravenous administration of 85 mL of Isovue 300.  Reconstructed coronal and sagittal images were also obtained. Automated exposure control and iterative construction methods were used.  The radiation dose reduction device was turned on for each scan per the ALARA (As Low as Reasonably Achievable) protocol.  FINDINGS: The lung bases are clear without evidence for focal consolidation or significant pleural effusion.  The liver, spleen, and pancreas are unremarkable. The gallbladder and bile ducts are unremarkable. The bilateral adrenal glands are symmetric and unremarkable. The bilateral kidneys are symmetric and unremarkable.  There is no bowel dilatation or obstruction. A normal-appearing appendix is observed adjacent to the distal ileum extending towards the midline of the pelvis. There is no evidence for acute appendicitis. Small free fluid is seen in the pelvis likely physiologic in nature related to ovulation. Physiologic ovarian cysts are noted. No abnormal fluid collections are identified. No significant lymphadenopathy is seen throughout the abdomen or pelvis. The bladder is partially decompressed. The celiac and superior mesenteric arterial distributions are opacified without evidence for occlusion.  No acute osseous abnormalities are observed.      1.  No evidence for acute abnormality throughout the abdomen or pelvis. No evidence for acute appendicitis.  This report was finalized on 11/8/2022 10:36 AM by Kenneth Tsai MD.      US Gallbladder    Result Date: 11/9/2022  DATE OF EXAM: 11/9/2022 8:20 AM  PROCEDURE: US GALLBLADDER-  INDICATIONS: abdominal pain, right upper chest pain; M62.82-Rhabdomyolysis; R10.9-Unspecified abdominal pain; R11.2-Nausea with vomiting, unspecified  COMPARISON: Abdomen and pelvis CT scan of 11/8/2022  TECHNIQUE: Grayscale and color Doppler ultrasound evaluation of the limited abdomen was performed.  FINDINGS: Patient history indicates abdominal pain, right chest pain.  Pancreas is mostly well seen except for the tip of the  pancreatic tail and appears grossly normal. Right and left liver lobes appear normal in morphology and echotexture. There is expected portal vein and hepatic vein waveform and direction of flow. Gallbladder is distended, typical of fasting state, and otherwise appears unremarkable, with no visible gallstones or evidence of gallbladder wall thickening. Common duct is normal in caliber at 2 mm. Right kidney measures 10.4 cm in length and appears within normal limits. No hydronephrosis is seen. No right upper quadrant ascites is seen.      Negative gallbladder ultrasound.  This report was finalized on 11/9/2022 9:13 AM by Dr. Cisco Gregorio MD.      XR Chest 1 View    Result Date: 11/9/2022   DATE OF EXAM: 11/9/2022 4:05 AM  PROCEDURE: XR CHEST 1 VW-  INDICATIONS: chest pain; M62.82-Rhabdomyolysis; R10.9-Unspecified abdominal pain; R11.2-Nausea with vomiting, unspecified  COMPARISON: 11/08/2022  TECHNIQUE: Portable chest radiograph.  FINDINGS:  The cardiomediastinal silhouette is within normal limits. The lungs are clear. There is no pneumothorax, focal consolidation, or large pleural effusion. Osseous structures grossly intact. Convex right thoracolumbar dextroscoliosis.      No acute process.  This report was finalized on 11/9/2022 7:00 AM by Dioni Collins MD.      XR Chest 1 View    Result Date: 11/8/2022  DATE OF EXAM: 11/8/2022 10:17 AM  PROCEDURE: XR CHEST 1 VW-  INDICATIONS: soa cough  COMPARISON: 09/22/2021  TECHNIQUE: Single radiographic AP view of the chest was obtained.  FINDINGS: Scoliosis. Heart size and pulmonary vasculature are within normal limits. Lungs clear. Costophrenic angles sharp      No radiographic findings of pneumonia  This report was finalized on 11/8/2022 10:32 AM by Amrik Galarza.                  Discharge Details        Discharge Medications      Continue These Medications      Instructions Start Date   ondansetron ODT 4 MG disintegrating tablet  Commonly known as: ZOFRAN-ODT   4 mg,  Translingual, Every 6 Hours PRN, As needed for nausea             Allergies   Allergen Reactions   • Doxycycline GI Intolerance     Discharge Disposition:  Home or Self Care    Diet:  Hospital:No active diet order    Activity:   As tolerated    Restrictions or Other Recommendations:   Follow up with Primary Care Provider in a week       CODE STATUS:    Code Status and Medical Interventions:   Ordered at: 11/08/22 1452     Level Of Support Discussed With:    Patient     Code Status (Patient has no pulse and is not breathing):    CPR (Attempt to Resuscitate)     Medical Interventions (Patient has pulse or is breathing):    Full Support     Release to patient:    Routine Release       Future Appointments   Date Time Provider Department Center   11/15/2022  9:00 AM Young Ravi APRN [facility]       Additional Instructions for the Follow-ups that You Need to Schedule     Discharge Follow-up with PCP   As directed       Currently Documented PCP:    Rizwana Odonnell APRN    PCP Phone Number:    [phone]     Follow Up Details: Primary Care Provider - 1 week                 Andriy Lora MD  11/10/22      Time Spent on Discharge:  I spent  32  minutes on this discharge activity which included: face-to-face encounter with the patient, reviewing the data in the system, coordination of the care with the nursing staff as well as consultants, documentation, and entering orders.

## 2022-12-21 ENCOUNTER — TELEMEDICINE (OUTPATIENT)
Dept: CARDIOLOGY | Facility: HOSPITAL | Age: 22
End: 2022-12-21

## 2022-12-21 DIAGNOSIS — R10.13 DYSPEPSIA: ICD-10-CM

## 2022-12-21 DIAGNOSIS — R00.2 PALPITATIONS: Primary | ICD-10-CM

## 2022-12-21 NOTE — PROGRESS NOTES
[facility], [facility]    This was an audio and video enabled telemedicine encounter.    You have chosen to receive care through the use of telemedicine. Telemedicine enables health care providers at different locations to provide safe, effective, and convenient care through the use of technology. As with any health care service, there are risks associated with the use of telemedicine, including equipment failure, poor connections, and  issues.    • Do you understand the risks and benefits of telemedicine as I have explained them to you? Yes  • Have your questions regarding telemedicine been answered? Yes  • Do you consent to the use of telemedicine in your medical care today? Yes    Chief Complaint  No chief complaint on file.    Subjective    History of Present Illness {CC  Problem List  Visit  Diagnosis   Encounters  Notes  Medications  Labs  Result Review Imaging  Media :23}     Oksana Aguirre presents to [facility] for   History of Present Illness     22-year-old female elevated blood pressure without diagnosis of hypertension, migraines.    Follow-up today on palpitations, discomfort and dyspepsia.  Has been placed on cardiac heart monitor and started on Pepcid.    Extended Holter 11/11/2022: Duration 14 days.  Average heart rate 88 bpm.  Controlled rate 82%.  PACs 1.1%.  12 brief SVT runs.  Longest duration 6 beats.  Patient symptoms associated with sinus tach and sinus arrhythmia.    Objective     Vital Signs:   There were no vitals filed for this visit.  There is no height or weight on file to calculate BMI.  Physical Exam         Result Review  Data Reviewed:{ Labs  Result Review  Imaging  Med Tab  Media :23}   Extended Holter 11/11/2022: Duration 14 days.  Average heart rate 88 bpm.  Controlled rate 82%.  PACs 1.1%.  12 brief SVT runs.  Longest duration 6 beats.  Patient symptoms associated  with sinus tach and sinus arrhythmia.  {Data reviewed (Optional):25741}            Assessment and Plan {CC Problem List  Visit Diagnosis  ROS  Review (Popup)  Health Maintenance  Quality  BestPractice  Medications  SmartSets  SnapShot Encounters  Media :23}   1. Palpitations  ***    2. Dyspepsia  ***      {Time Spent (Optional):82008}    Follow Up {Instructions Charge Capture  Follow-up Communications :23}   No follow-ups on file.    Patient was given instructions and counseling regarding her condition or for health maintenance advice. Please see specific information pulled into the AVS if appropriate.  Patient was instructed to call the [facility] with any questions, concerns, or worsening symptoms.

## 2022-12-21 NOTE — PROGRESS NOTES
Called pt for scheduled telehealth visit.  Due to expense pt asked to cancel appointment today.  Did review heart monitor results with pt, Her s/s have improved.  She will see PCP and f/u in [facility] if needed.

## 2022-12-22 PROCEDURE — 93248 EXT ECG>7D<15D REV&INTERPJ: CPT | Performed by: INTERNAL MEDICINE

## 2023-02-22 ENCOUNTER — APPOINTMENT (OUTPATIENT)
Dept: CT IMAGING | Facility: HOSPITAL | Age: 23
End: 2023-02-22

## 2023-02-22 ENCOUNTER — HOSPITAL ENCOUNTER (EMERGENCY)
Facility: HOSPITAL | Age: 23
Discharge: HOME | End: 2023-02-22
Attending: EMERGENCY MEDICINE | Admitting: EMERGENCY MEDICINE

## 2023-02-22 VITALS
DIASTOLIC BLOOD PRESSURE: 91 MMHG | HEART RATE: 98 BPM | OXYGEN SATURATION: 100 % | RESPIRATION RATE: 20 BRPM | BODY MASS INDEX: 22.08 KG/M2 | WEIGHT: 120 LBS | SYSTOLIC BLOOD PRESSURE: 146 MMHG | TEMPERATURE: 98.4 F | HEIGHT: 62 IN

## 2023-02-22 DIAGNOSIS — S06.0X1A CONCUSSION WITH LOSS OF CONSCIOUSNESS OF 30 MINUTES OR LESS, INITIAL ENCOUNTER: ICD-10-CM

## 2023-02-22 DIAGNOSIS — S00.33XA CONTUSION OF NOSE, INITIAL ENCOUNTER: ICD-10-CM

## 2023-02-22 DIAGNOSIS — S16.1XXA STRAIN OF NECK MUSCLE, INITIAL ENCOUNTER: ICD-10-CM

## 2023-02-22 DIAGNOSIS — S09.90XA INJURY OF HEAD, INITIAL ENCOUNTER: Primary | ICD-10-CM

## 2023-02-22 LAB
B-HCG UR QL: NEGATIVE
EXPIRATION DATE: NORMAL
INTERNAL NEGATIVE CONTROL: NORMAL
INTERNAL POSITIVE CONTROL: NORMAL
Lab: NORMAL

## 2023-02-22 PROCEDURE — 72125 CT NECK SPINE W/O DYE: CPT

## 2023-02-22 PROCEDURE — 99283 EMERGENCY DEPT VISIT LOW MDM: CPT

## 2023-02-22 PROCEDURE — 25010000002 KETOROLAC TROMETHAMINE PER 15 MG: Performed by: NURSE PRACTITIONER

## 2023-02-22 PROCEDURE — 70486 CT MAXILLOFACIAL W/O DYE: CPT

## 2023-02-22 PROCEDURE — 70450 CT HEAD/BRAIN W/O DYE: CPT

## 2023-02-22 PROCEDURE — 96375 TX/PRO/DX INJ NEW DRUG ADDON: CPT

## 2023-02-22 PROCEDURE — 81025 URINE PREGNANCY TEST: CPT | Performed by: EMERGENCY MEDICINE

## 2023-02-22 PROCEDURE — 25010000002 ONDANSETRON PER 1 MG: Performed by: NURSE PRACTITIONER

## 2023-02-22 PROCEDURE — 96374 THER/PROPH/DIAG INJ IV PUSH: CPT

## 2023-02-22 RX ORDER — SODIUM CHLORIDE 0.9 % (FLUSH) 0.9 %
10 SYRINGE (ML) INJECTION AS NEEDED
Status: DISCONTINUED | OUTPATIENT
Start: 2023-02-22 | End: 2023-02-23 | Stop reason: HOSPADM

## 2023-02-22 RX ORDER — ONDANSETRON 4 MG/1
4 TABLET, ORALLY DISINTEGRATING ORAL EVERY 6 HOURS PRN
Qty: 15 TABLET | Refills: 0 | Status: SHIPPED | OUTPATIENT
Start: 2023-02-22

## 2023-02-22 RX ORDER — KETOROLAC TROMETHAMINE 15 MG/ML
15 INJECTION, SOLUTION INTRAMUSCULAR; INTRAVENOUS ONCE
Status: COMPLETED | OUTPATIENT
Start: 2023-02-22 | End: 2023-02-22

## 2023-02-22 RX ORDER — ONDANSETRON 2 MG/ML
4 INJECTION INTRAMUSCULAR; INTRAVENOUS ONCE
Status: COMPLETED | OUTPATIENT
Start: 2023-02-22 | End: 2023-02-22

## 2023-02-22 RX ADMIN — KETOROLAC TROMETHAMINE 15 MG: 15 INJECTION, SOLUTION INTRAMUSCULAR; INTRAVENOUS at 21:10

## 2023-02-22 RX ADMIN — SODIUM CHLORIDE 1000 ML: 9 INJECTION, SOLUTION INTRAVENOUS at 21:10

## 2023-02-22 RX ADMIN — ONDANSETRON 4 MG: 2 INJECTION INTRAMUSCULAR; INTRAVENOUS at 21:09

## 2023-08-18 NOTE — TELEPHONE ENCOUNTER
Pt believes she is having a reaction to a medicine she was recently prescribed--Doxycycline.     She's had bad heart burn that led to vomiting.    Please advise.    Pt contact [phone]   Complex Repair And Burow's Graft Text: The defect edges were debeveled with a #15 scalpel blade.  The primary defect was closed partially with a complex linear closure.  Given the location of the defect, shape of the defect, the proximity to free margins and the presence of a standing cone deformity a Burow's graft was deemed most appropriate to repair the remaining defect.  The graft was trimmed to fit the size of the remaining defect.  The graft was then placed in the primary defect, oriented appropriately, and sutured into place.

## 2023-11-12 ENCOUNTER — HOSPITAL ENCOUNTER (EMERGENCY)
Facility: HOSPITAL | Age: 23
Discharge: HOME | End: 2023-11-12
Attending: EMERGENCY MEDICINE | Admitting: EMERGENCY MEDICINE
Payer: MEDICAID

## 2023-11-12 VITALS
SYSTOLIC BLOOD PRESSURE: 140 MMHG | TEMPERATURE: 98.2 F | WEIGHT: 120 LBS | HEIGHT: 62 IN | DIASTOLIC BLOOD PRESSURE: 103 MMHG | OXYGEN SATURATION: 97 % | BODY MASS INDEX: 22.08 KG/M2 | RESPIRATION RATE: 14 BRPM | HEART RATE: 89 BPM

## 2023-11-12 DIAGNOSIS — J02.9 ACUTE SORE THROAT: Primary | ICD-10-CM

## 2023-11-12 PROCEDURE — 99282 EMERGENCY DEPT VISIT SF MDM: CPT

## 2023-11-12 RX ORDER — PHENOL 1.4 %
1 AEROSOL, SPRAY (ML) MUCOUS MEMBRANE
Qty: 20 ML | Refills: 0 | Status: SHIPPED | OUTPATIENT
Start: 2023-11-12

## 2023-11-12 RX ORDER — CEFDINIR 300 MG/1
300 CAPSULE ORAL 2 TIMES DAILY
COMMUNITY

## 2023-11-12 NOTE — ED PROVIDER NOTES
Subjective   History of Present Illness  23-year-old female who presents for evaluation of sore throat.  The patient reports that she began to have a sore throat roughly 3 weeks ago.  She was seen in urgent treatment center had a negative strep screen and negative COVID and flu screen.  She was initiated on antibiotics at that given time.  She does not remember the name of the antibiotics.  She completed his antibiotics but continues to have a sore throat.  She has now presented to the ER.  She states that she felt like she had a fever this morning.  She did take some Tylenol, has not taken any since.  She does not have a fever upon presentation here at this given time.  She is concerned that she may have a abscess.  She actually states that she has a history of tonsilloliths and actually was picking them out prior to the onset of the sore throat roughly 3 weeks ago.  No chest pain, cough, shortness of breath.  No abdominal pain, no nausea or vomiting, no change in bowel or urinary function.      Review of Systems   Constitutional:  Negative for chills, fatigue and fever.   HENT:  Positive for sore throat. Negative for congestion, ear pain, postnasal drip and sinus pressure.    Eyes:  Negative for pain, redness and visual disturbance.   Respiratory:  Negative for cough, chest tightness and shortness of breath.    Cardiovascular:  Negative for chest pain, palpitations and leg swelling.   Gastrointestinal:  Negative for abdominal pain, anal bleeding, blood in stool, diarrhea, nausea and vomiting.   Endocrine: Negative for polydipsia and polyuria.   Genitourinary:  Negative for difficulty urinating, dysuria, frequency and urgency.   Musculoskeletal:  Negative for arthralgias, back pain and neck pain.   Skin:  Negative for pallor and rash.   Allergic/Immunologic: Negative for environmental allergies and immunocompromised state.   Neurological:  Negative for dizziness, weakness and headaches.   Hematological:  Negative  for adenopathy.   Psychiatric/Behavioral:  Negative for confusion, self-injury and suicidal ideas. The patient is not nervous/anxious.    All other systems reviewed and are negative.      Past Medical History:   Diagnosis Date    Hypertension     Low back pain 2014    I was diagnosed wih scolisos    Migraine        Allergies   Allergen Reactions    Doxycycline GI Intolerance       Past Surgical History:   Procedure Laterality Date    ADENOIDECTOMY      EAR TUBES Bilateral     INNER EAR SURGERY Right        Family History   Problem Relation Age of Onset    Alcohol abuse Father     Hypertension Maternal Grandmother     Anemia Maternal Grandmother     Cancer Maternal Grandmother     Cancer Maternal Grandfather     Parkinsonism Paternal Grandfather     Other Mother         Chrons disease       Social History     Socioeconomic History    Marital status: Significant Other   Tobacco Use    Smoking status: Never    Smokeless tobacco: Never   Vaping Use    Vaping Use: Never used   Substance and Sexual Activity    Alcohol use: Yes    Drug use: No    Sexual activity: Yes     Partners: Male     Birth control/protection: Condom, Pill           Objective   Physical Exam  Vitals and nursing note reviewed.   Constitutional:       General: She is not in acute distress.     Appearance: Normal appearance. She is well-developed. She is not toxic-appearing or diaphoretic.   HENT:      Head: Normocephalic and atraumatic.      Right Ear: External ear normal.      Left Ear: External ear normal.      Nose: Nose normal.      Mouth/Throat:      Comments: No tonsillar swelling, no tonsillar erythema, no tonsillar exudate.  Uvula is midline.  Posterior oropharynx is patent.  Eyes:      General: Lids are normal.      Pupils: Pupils are equal, round, and reactive to light.   Neck:      Trachea: No tracheal deviation.   Cardiovascular:      Rate and Rhythm: Normal rate and regular rhythm.      Pulses: No decreased pulses.      Heart sounds: Normal  heart sounds. No murmur heard.     No friction rub. No gallop.   Pulmonary:      Effort: Pulmonary effort is normal. No respiratory distress.      Breath sounds: Normal breath sounds. No decreased breath sounds, wheezing, rhonchi or rales.   Abdominal:      General: Bowel sounds are normal.      Palpations: Abdomen is soft.      Tenderness: There is no abdominal tenderness. There is no guarding or rebound.   Musculoskeletal:         General: No deformity. Normal range of motion.      Cervical back: Normal range of motion and neck supple.   Lymphadenopathy:      Cervical: No cervical adenopathy.   Skin:     General: Skin is warm and dry.      Findings: No rash.   Neurological:      Mental Status: She is alert and oriented to person, place, and time.      Cranial Nerves: No cranial nerve deficit.      Sensory: No sensory deficit.   Psychiatric:         Speech: Speech normal.         Behavior: Behavior normal.         Thought Content: Thought content normal.         Judgment: Judgment normal.         Procedures           ED Course                                   SHEN reviewed by Shanon Paredes MD       Medical Decision Making  Differential diagnosis includes pharyngitis, strep throat, viral illness, peritonsillar abscess, retropharyngeal abscess, other unspecified etiology.    Throat is normal in appearance.  No exudate erythema.  No tonsillar swelling, midline uvula.    I feel the patient has had tonsillar irritation secondary to her picking at tonsil lifts.  There are no signs of secondary infection at this time.    The patient will be advised to use Chloraseptic spray to help numb the throat, drink plenty of fluids, and take Tylenol or ibuprofen as needed for pain.    Problems Addressed:  Acute sore throat: complicated acute illness or injury with systemic symptoms    Risk  OTC drugs.        Final diagnoses:   Acute sore throat       ED Disposition  ED Disposition       ED Disposition   Discharge     Condition   Stable    Comment   --               Rizwana Odonnell R, APRN  [address]  [address]  [facility] [address]  [phone]    In 1 week           Medication List        New Prescriptions      Chloraseptic 1.4 % liquid liquid  Generic drug: phenol  Apply 1 spray to the mouth or throat Every 2 (Two) Hours As Needed (sore throat).               Where to Get Your Medications        These medications were sent to [id number] - [place], [place] - [address] AT [facility] [address] - [phone]  - [phone]   [address], [facility] [address]      Phone: [phone]   Chloraseptic 1.4 % liquid liquid            Shanon Paredes MD  11/12/23 9000

## 2023-11-12 NOTE — Clinical Note
Psychiatric EMERGENCY DEPARTMENT  [address]  [facility] [address]  Phone: [phone]    Oksana Aguirre was seen and treated in our emergency department on 11/12/2023.  She may return to work on 11/15/2023.         Thank you for choosing [facility].    Shanon Paredes MD

## 2023-11-12 NOTE — Clinical Note
[facility] EMERGENCY DEPARTMENT  [address]  [facility] [address]  Phone: [phone]    Oksana Aguirre was seen and treated in our emergency department on 11/12/2023.  She may return to work on 11/15/2023.         Thank you for choosing [facility].    Shanon Paredes MD

## 2023-11-12 NOTE — DISCHARGE INSTRUCTIONS
Use Tylenol or ibuprofen as needed help with pain.    Use Chloraseptic throat spray spray to help with sore throat.

## 2024-06-23 ENCOUNTER — APPOINTMENT (OUTPATIENT)
Dept: ULTRASOUND IMAGING | Facility: HOSPITAL | Age: 24
End: 2024-06-23
Payer: COMMERCIAL

## 2024-06-23 ENCOUNTER — HOSPITAL ENCOUNTER (EMERGENCY)
Facility: HOSPITAL | Age: 24
Discharge: HOME | End: 2024-06-23
Attending: EMERGENCY MEDICINE | Admitting: EMERGENCY MEDICINE
Payer: COMMERCIAL

## 2024-06-23 VITALS
HEIGHT: 62 IN | OXYGEN SATURATION: 100 % | TEMPERATURE: 98.7 F | WEIGHT: 115 LBS | RESPIRATION RATE: 16 BRPM | DIASTOLIC BLOOD PRESSURE: 90 MMHG | BODY MASS INDEX: 21.16 KG/M2 | SYSTOLIC BLOOD PRESSURE: 132 MMHG | HEART RATE: 92 BPM

## 2024-06-23 DIAGNOSIS — O46.90 VAGINAL BLEEDING IN PREGNANCY: Primary | ICD-10-CM

## 2024-06-23 LAB
ABO GROUP BLD: NORMAL
ALBUMIN SERPL-MCNC: 4.2 G/DL (ref 3.5–5.2)
ALBUMIN/GLOB SERPL: 1.6 G/DL
ALP SERPL-CCNC: 66 U/L (ref 39–117)
ALT SERPL W P-5'-P-CCNC: 9 U/L (ref 1–33)
ANION GAP SERPL CALCULATED.3IONS-SCNC: 8 MMOL/L (ref 5–15)
AST SERPL-CCNC: 17 U/L (ref 1–32)
BACTERIA UR QL AUTO: NORMAL /HPF
BASOPHILS # BLD AUTO: 0.03 10*3/MM3 (ref 0–0.2)
BASOPHILS NFR BLD AUTO: 0.6 % (ref 0–1.5)
BILIRUB SERPL-MCNC: 0.2 MG/DL (ref 0–1.2)
BILIRUB UR QL STRIP: NEGATIVE
BUN SERPL-MCNC: 10 MG/DL (ref 6–20)
BUN/CREAT SERPL: 13.3 (ref 7–25)
CALCIUM SPEC-SCNC: 9.2 MG/DL (ref 8.6–10.5)
CHLORIDE SERPL-SCNC: 105 MMOL/L (ref 98–107)
CLARITY UR: ABNORMAL
CO2 SERPL-SCNC: 25 MMOL/L (ref 22–29)
COLOR UR: YELLOW
CREAT SERPL-MCNC: 0.75 MG/DL (ref 0.57–1)
DEPRECATED RDW RBC AUTO: 44.3 FL (ref 37–54)
EGFRCR SERPLBLD CKD-EPI 2021: 114.2 ML/MIN/1.73
EOSINOPHIL # BLD AUTO: 0.09 10*3/MM3 (ref 0–0.4)
EOSINOPHIL NFR BLD AUTO: 1.7 % (ref 0.3–6.2)
ERYTHROCYTE [DISTWIDTH] IN BLOOD BY AUTOMATED COUNT: 12.9 % (ref 12.3–15.4)
GLOBULIN UR ELPH-MCNC: 2.7 GM/DL
GLUCOSE SERPL-MCNC: 76 MG/DL (ref 65–99)
GLUCOSE UR STRIP-MCNC: NEGATIVE MG/DL
HCG INTACT+B SERPL-ACNC: 2195 MIU/ML
HCT VFR BLD AUTO: 39.3 % (ref 34–46.6)
HGB BLD-MCNC: 12.7 G/DL (ref 12–15.9)
HGB UR QL STRIP.AUTO: NEGATIVE
HOLD SPECIMEN: NORMAL
HYALINE CASTS UR QL AUTO: NORMAL /LPF
IMM GRANULOCYTES # BLD AUTO: 0.01 10*3/MM3 (ref 0–0.05)
IMM GRANULOCYTES NFR BLD AUTO: 0.2 % (ref 0–0.5)
KETONES UR QL STRIP: NEGATIVE
LEUKOCYTE ESTERASE UR QL STRIP.AUTO: NEGATIVE
LYMPHOCYTES # BLD AUTO: 1.42 10*3/MM3 (ref 0.7–3.1)
LYMPHOCYTES NFR BLD AUTO: 26.7 % (ref 19.6–45.3)
MCH RBC QN AUTO: 30.4 PG (ref 26.6–33)
MCHC RBC AUTO-ENTMCNC: 32.3 G/DL (ref 31.5–35.7)
MCV RBC AUTO: 94 FL (ref 79–97)
MONOCYTES # BLD AUTO: 0.6 10*3/MM3 (ref 0.1–0.9)
MONOCYTES NFR BLD AUTO: 11.3 % (ref 5–12)
NEUTROPHILS NFR BLD AUTO: 3.17 10*3/MM3 (ref 1.7–7)
NEUTROPHILS NFR BLD AUTO: 59.5 % (ref 42.7–76)
NITRITE UR QL STRIP: NEGATIVE
NRBC BLD AUTO-RTO: 0 /100 WBC (ref 0–0.2)
NUMBER OF DOSES: NORMAL
PH UR STRIP.AUTO: 8 [PH] (ref 5–8)
PLATELET # BLD AUTO: 237 10*3/MM3 (ref 140–450)
PMV BLD AUTO: 9.9 FL (ref 6–12)
POTASSIUM SERPL-SCNC: 3.7 MMOL/L (ref 3.5–5.2)
PROT SERPL-MCNC: 6.9 G/DL (ref 6–8.5)
PROT UR QL STRIP: NEGATIVE
RBC # BLD AUTO: 4.18 10*6/MM3 (ref 3.77–5.28)
RBC # UR STRIP: NORMAL /HPF
REF LAB TEST METHOD: NORMAL
RH BLD: POSITIVE
SODIUM SERPL-SCNC: 138 MMOL/L (ref 136–145)
SP GR UR STRIP: 1.02 (ref 1–1.03)
SQUAMOUS #/AREA URNS HPF: NORMAL /HPF
UROBILINOGEN UR QL STRIP: ABNORMAL
WBC # UR STRIP: NORMAL /HPF
WBC NRBC COR # BLD AUTO: 5.32 10*3/MM3 (ref 3.4–10.8)
WHOLE BLOOD HOLD COAG: NORMAL
WHOLE BLOOD HOLD SPECIMEN: NORMAL

## 2024-06-23 PROCEDURE — 86900 BLOOD TYPING SEROLOGIC ABO: CPT | Performed by: PHYSICIAN ASSISTANT

## 2024-06-23 PROCEDURE — 81001 URINALYSIS AUTO W/SCOPE: CPT | Performed by: PHYSICIAN ASSISTANT

## 2024-06-23 PROCEDURE — 80053 COMPREHEN METABOLIC PANEL: CPT | Performed by: PHYSICIAN ASSISTANT

## 2024-06-23 PROCEDURE — 76817 TRANSVAGINAL US OBSTETRIC: CPT

## 2024-06-23 PROCEDURE — 36415 COLL VENOUS BLD VENIPUNCTURE: CPT

## 2024-06-23 PROCEDURE — 85025 COMPLETE CBC W/AUTO DIFF WBC: CPT | Performed by: PHYSICIAN ASSISTANT

## 2024-06-23 PROCEDURE — 99284 EMERGENCY DEPT VISIT MOD MDM: CPT

## 2024-06-23 PROCEDURE — 84702 CHORIONIC GONADOTROPIN TEST: CPT | Performed by: PHYSICIAN ASSISTANT

## 2024-06-23 PROCEDURE — 86901 BLOOD TYPING SEROLOGIC RH(D): CPT | Performed by: PHYSICIAN ASSISTANT

## 2024-06-23 RX ORDER — SODIUM CHLORIDE 0.9 % (FLUSH) 0.9 %
10 SYRINGE (ML) INJECTION AS NEEDED
Status: DISCONTINUED | OUTPATIENT
Start: 2024-06-23 | End: 2024-06-23 | Stop reason: HOSPADM

## 2024-06-23 NOTE — ED PROVIDER NOTES
Subjective   History of Present Illness  Pt is a 22 yo female presenting to ED with complaints of vaginal bleeding in pregnancy. PMHX significant for HTN and Migraines. Pt explains she developed vaginal spotting and lower abdominal cramping last night and persisted this morning. She took a positive pregnancy test last week. LMP 5-19-24 and . She does not have a OBGYN or had pregnancy confirmed with US. She denies dizziness, syncope, fever, CP, SOB, N/V or urinary sx. She has had diarrhea intermittent for the past week. She denies prior abdominal surgical hx. She denies tobacco, drug or ETOH use.     History provided by:  Patient and medical records      Review of Systems   Constitutional:  Negative for fever.   Respiratory:  Negative for cough and shortness of breath.    Cardiovascular:  Negative for chest pain.   Gastrointestinal:  Positive for abdominal pain and diarrhea. Negative for nausea and vomiting.   Genitourinary:  Positive for vaginal bleeding. Negative for difficulty urinating, dysuria, flank pain, vaginal discharge and vaginal pain.   Musculoskeletal:  Negative for back pain.   Neurological:  Negative for dizziness and syncope.       Past Medical History:   Diagnosis Date    Hypertension     Low back pain     I was diagnosed wih eileen    Migraine        Allergies   Allergen Reactions    Doxycycline GI Intolerance       Past Surgical History:   Procedure Laterality Date    ADENOIDECTOMY      EAR TUBES Bilateral     INNER EAR SURGERY Right        Family History   Problem Relation Age of Onset    Alcohol abuse Father     Hypertension Maternal Grandmother     Anemia Maternal Grandmother     Cancer Maternal Grandmother     Cancer Maternal Grandfather     Parkinsonism Paternal Grandfather     Other Mother         Chrons disease       Social History     Socioeconomic History    Marital status: Significant Other   Tobacco Use    Smoking status: Never    Smokeless tobacco: Never   Vaping Use    Vaping  status: Never Used   Substance and Sexual Activity    Alcohol use: Yes    Drug use: No    Sexual activity: Yes     Partners: Male     Birth control/protection: Condom, Pill           Objective   Physical Exam  Vitals and nursing note reviewed.   Constitutional:       General: She is not in acute distress.  HENT:      Head: Atraumatic.   Eyes:      Extraocular Movements: Extraocular movements intact.      Conjunctiva/sclera: Conjunctivae normal.   Cardiovascular:      Rate and Rhythm: Normal rate.   Pulmonary:      Effort: Pulmonary effort is normal. No respiratory distress.   Abdominal:      Tenderness: There is abdominal tenderness in the suprapubic area. There is no right CVA tenderness or left CVA tenderness.   Musculoskeletal:         General: Normal range of motion.      Cervical back: Normal range of motion and neck supple.   Skin:     General: Skin is warm.   Neurological:      General: No focal deficit present.      Mental Status: She is alert.   Psychiatric:         Mood and Affect: Mood normal.         Behavior: Behavior normal.         Procedures           ED Course      Recent Results (from the past 24 hour(s))   Comprehensive Metabolic Panel    Collection Time: 06/23/24  1:32 PM    Specimen: Blood   Result Value Ref Range    Glucose 76 65 - 99 mg/dL    BUN 10 6 - 20 mg/dL    Creatinine 0.75 0.57 - 1.00 mg/dL    Sodium 138 136 - 145 mmol/L    Potassium 3.7 3.5 - 5.2 mmol/L    Chloride 105 98 - 107 mmol/L    CO2 25.0 22.0 - 29.0 mmol/L    Calcium 9.2 8.6 - 10.5 mg/dL    Total Protein 6.9 6.0 - 8.5 g/dL    Albumin 4.2 3.5 - 5.2 g/dL    ALT (SGPT) 9 1 - 33 U/L    AST (SGOT) 17 1 - 32 U/L    Alkaline Phosphatase 66 39 - 117 U/L    Total Bilirubin 0.2 0.0 - 1.2 mg/dL    Globulin 2.7 gm/dL    A/G Ratio 1.6 g/dL    BUN/Creatinine Ratio 13.3 7.0 - 25.0    Anion Gap 8.0 5.0 - 15.0 mmol/L    eGFR 114.2 >60.0 mL/min/1.73   hCG, Quantitative, Pregnancy    Collection Time: 06/23/24  1:32 PM    Specimen: Blood    Result Value Ref Range    HCG Quantitative 2,195.00 mIU/mL    RhIg Evaluation    Collection Time: 24  1:32 PM    Specimen: Blood   Result Value Ref Range    ABO Type A     RH type Positive    Doses of Rh Immune Globulin    Collection Time: 24  1:32 PM    Specimen: Blood   Result Value Ref Range    Number of Doses       RhIg is not indicated due to the patient's Rh status   CBC Auto Differential    Collection Time: 24  1:32 PM    Specimen: Blood   Result Value Ref Range    WBC 5.32 3.40 - 10.80 10*3/mm3    RBC 4.18 3.77 - 5.28 10*6/mm3    Hemoglobin 12.7 12.0 - 15.9 g/dL    Hematocrit 39.3 34.0 - 46.6 %    MCV 94.0 79.0 - 97.0 fL    MCH 30.4 26.6 - 33.0 pg    MCHC 32.3 31.5 - 35.7 g/dL    RDW 12.9 12.3 - 15.4 %    RDW-SD 44.3 37.0 - 54.0 fl    MPV 9.9 6.0 - 12.0 fL    Platelets 237 140 - 450 10*3/mm3    Neutrophil % 59.5 42.7 - 76.0 %    Lymphocyte % 26.7 19.6 - 45.3 %    Monocyte % 11.3 5.0 - 12.0 %    Eosinophil % 1.7 0.3 - 6.2 %    Basophil % 0.6 0.0 - 1.5 %    Immature Grans % 0.2 0.0 - 0.5 %    Neutrophils, Absolute 3.17 1.70 - 7.00 10*3/mm3    Lymphocytes, Absolute 1.42 0.70 - 3.10 10*3/mm3    Monocytes, Absolute 0.60 0.10 - 0.90 10*3/mm3    Eosinophils, Absolute 0.09 0.00 - 0.40 10*3/mm3    Basophils, Absolute 0.03 0.00 - 0.20 10*3/mm3    Immature Grans, Absolute 0.01 0.00 - 0.05 10*3/mm3    nRBC 0.0 0.0 - 0.2 /100 WBC   Green Top (Gel)    Collection Time: 24  1:32 PM   Result Value Ref Range    Extra Tube Hold for add-ons.    Lavender Top    Collection Time: 24  1:32 PM   Result Value Ref Range    Extra Tube hold for add-on    Gold Top - SST    Collection Time: 24  1:32 PM   Result Value Ref Range    Extra Tube Hold for add-ons.    Gray Top    Collection Time: 24  1:32 PM   Result Value Ref Range    Extra Tube Hold for add-ons.    Light Blue Top    Collection Time: 24  1:32 PM   Result Value Ref Range    Extra Tube Hold for add-ons.    Urinalysis With  Microscopic If Indicated (No Culture) - Urine, Clean Catch    Collection Time: 06/23/24  1:33 PM    Specimen: Urine, Clean Catch   Result Value Ref Range    Color, UA Yellow Yellow, Straw    Appearance, UA Turbid (A) Clear    pH, UA 8.0 5.0 - 8.0    Specific Gravity, UA 1.016 1.001 - 1.030    Glucose, UA Negative Negative    Ketones, UA Negative Negative    Bilirubin, UA Negative Negative    Blood, UA Negative Negative    Protein, UA Negative Negative    Leuk Esterase, UA Negative Negative    Nitrite, UA Negative Negative    Urobilinogen, UA 0.2 E.U./dL 0.2 - 1.0 E.U./dL   Urinalysis, Microscopic Only - Urine, Clean Catch    Collection Time: 06/23/24  1:33 PM    Specimen: Urine, Clean Catch   Result Value Ref Range    RBC, UA 0-2 None Seen, 0-2 /HPF    WBC, UA 0-2 None Seen, 0-2 /HPF    Bacteria, UA None Seen None Seen, Trace /HPF    Squamous Epithelial Cells, UA 0-2 None Seen, 0-2 /HPF    Hyaline Casts, UA 0-6 0 - 6 /LPF    Methodology Automated Microscopy      Note: In addition to lab results from this visit, the labs listed above may include labs taken at another facility or during a different encounter within the last 24 hours. Please correlate lab times with ED admission and discharge times for further clarification of the services performed during this visit.    US Ob Transvaginal   Final Result   Impression:   1.Small anechoic focus in the endometrium which is not definitive for, but could represent an early intrauterine pregnancy. Yolk sac, fetal pole, and cardiac activity were not visible, possibly due to early pregnancy. Correlate with hCG level.    2.Pseudogestational sac must also be included in the differential diagnosis although no other findings to suggest ectopic pregnancy were seen at this time. Correlate clinically and follow-up as clinically indicated.   3.Ovaries were not visible due to bowel gas.            Electronically Signed: Alton Benton     6/23/2024 1:35 PM EDT     Workstation ID:  "[id number]        Vitals:    06/23/24 1215   BP: 132/90   BP Location: Left arm   Patient Position: Sitting   Pulse: 92   Resp: 16   Temp: 98.7 °F (37.1 °C)   TempSrc: Oral   SpO2: 100%   Weight: 52.2 kg (115 lb)   Height: 157.5 cm (62\")     Medications   sodium chloride 0.9 % flush 10 mL (has no administration in time range)     ECG/EMG Results (last 24 hours)       ** No results found for the last 24 hours. **          No orders to display       DISCHARGE    Patient discharged in stable condition.    Reviewed implications of results, diagnosis, meds, responsibility to follow up, warning signs and symptoms of possible worsening, potential complications and reasons to return to ER.    Patient/Family voiced understanding of above instructions.    Discussed plan for discharge, as there is no emergent indication for admission.  Pt/family is agreeable and understands need for follow up and possible repeat testing.  Pt/family is aware that discharge does not mean that nothing is wrong but that it indicates no emergency is currently present that requires admission and they must continue care with follow-up as given below or with a physician of their choice.     FOLLOW-UP  Rizwana Odonnell R, APRN  [address]  [address]  [place] [id number]  [phone]    Schedule an appointment as soon as possible for a visit       gO Rainey MD  [address] [facility] [address]  [facility] [address]  [phone]    Schedule an appointment as soon as possible for a visit       [facility] EMERGENCY DEPARTMENT  [address] [facility] [id number] [phone]    If symptoms worsen         Medication List        Stop      benzonatate 200 MG capsule  Commonly known as: TESSALON                                                   Medical Decision Making  Pt is a 24 yo female presenting to ED with complaints of vaginal bleeding and abdominal cramping in pregnancy. Labs in ED notable for WBC 5.32, Hgn " 12.7, Hct 39.3, Cr 0.75, Glucose 76, HCG quant 2,195 and blood type A+. Patient does not need Rhogam. US with findings of small focus in endometrium and could represent early IUP. Discussed results and tx plan. Discussed close f/u with OBGYN and new / worse sx to return to ED.     DDx  Ectopic, Early pregnancy, Miscarriage, UTI, Anemia     Problems Addressed:  Vaginal bleeding in pregnancy: complicated acute illness or injury    Amount and/or Complexity of Data Reviewed  External Data Reviewed: notes.     Details: Reviewed previous non ED visits including prior labs, imaging, available notes, medications, allergies and surgical hx.     Labs: ordered. Decision-making details documented in ED Course.  Radiology: ordered. Decision-making details documented in ED Course.    Risk  Prescription drug management.        Final diagnoses:   Vaginal bleeding in pregnancy       ED Disposition  ED Disposition       ED Disposition   Discharge    Condition   Stable    Comment   --               Rizwana Odonnell, APRN  [address]  [address]  [place] [id number]  [phone]    Schedule an appointment as soon as possible for a visit       Og Rainey MD  [address] [facility] [address]  [place] [id number]  [phone]    Schedule an appointment as soon as possible for a visit       [facility] EMERGENCY DEPARTMENT  [address] [facility] [address] [phone]    If symptoms worsen         Medication List        Stop      benzonatate 200 MG capsule  Commonly known as: Vanessa Kennedy PA  06/23/24 2736     Eye Clamp Note Details: An eye clamp was used during the procedure.

## 2024-06-24 ENCOUNTER — TELEPHONE (OUTPATIENT)
Dept: OBSTETRICS AND GYNECOLOGY | Facility: CLINIC | Age: 24
End: 2024-06-24
Payer: COMMERCIAL

## 2024-06-24 DIAGNOSIS — O20.0 THREATENED ABORTION: Primary | ICD-10-CM

## 2024-06-24 NOTE — TELEPHONE ENCOUNTER
Patient has not been seen in this office before.  Returned patient's call.   She was seen in [facility] ER yesterday with vaginal spotting & cramping in early pregnancy.  LMP 05/19/24 = 5w 1d  +UPT 06/18/24  HCG = 2195  Ultrasound in ER:   Impression:  1.Small anechoic focus in the endometrium which is not definitive for, but could represent an early intrauterine pregnancy. Yolk sac, fetal pole, and cardiac activity were not visible, possibly due to early pregnancy. Correlate with hCG level.   2.Pseudogestational sac must also be included in the differential diagnosis although no other findings to suggest ectopic pregnancy were seen at this time. Correlate clinically and follow-up as clinically indicated.  3.Ovaries were not visible due to bowel gas.  Patient states she is still having intermittent pink & brown spotting with intermittent mild to moderate cramping. Denies any abdominal pain.   MBT is A positive.   Discussed with REJI Nicholson. She recommends patient be seen here next week; have HCG & Progesterone levels drawn tomorrow; pelvic rest; no strenuous activity or heavy lifting; and ectopic precautions.   Informed patient; reviewed instructions for pelvic rest. Advised to go to ER for heavy bleeding, passing clots golf ball size or larger, or severe pain, especially if localized to one side.  Patient v/u and will come to office tomorrow after 1:30 pm to have labs drawn. Appointment scheduled for next week.

## 2024-06-24 NOTE — TELEPHONE ENCOUNTER
NEW OB - PT CALLING IN AFTER BEING IN SEEN IN ER DUE TO VAGINAL BLEEDING WITH CRAMPING. TOLD TO SCHEDULE ASAP / LMP 05/19

## 2024-06-25 ENCOUNTER — LAB (OUTPATIENT)
Dept: OBSTETRICS AND GYNECOLOGY | Facility: CLINIC | Age: 24
End: 2024-06-25
Payer: COMMERCIAL

## 2024-06-26 ENCOUNTER — TELEPHONE (OUTPATIENT)
Dept: OBSTETRICS AND GYNECOLOGY | Facility: CLINIC | Age: 24
End: 2024-06-26
Payer: COMMERCIAL

## 2024-06-26 LAB
HCG INTACT+B SERPL-ACNC: NORMAL MIU/ML
PROGEST SERPL-MCNC: 16.5 NG/ML

## 2024-06-26 NOTE — TELEPHONE ENCOUNTER
Patient has not been seen here before. See previous phone call encounter from 06/24/24. Patient is scheduled for New GYN visit for TAB on 07/03/24.  Patient returned my call.   Reports started having thick brown vaginal discharge yesterday. A few hours later had heavy bright red vaginal bleeding that lasted an hour; had bleeding into the toilet. Put a pad on; only partially saturated it.   Today is having the thick brown discharge. Having intermittent lower abdominal cramping and pressure.   Denies passing any tissue or large clots.   MBT is A positive.   Discussed with REJI Jang. She recommends patient be seen tomorrow with ultrasound. Ectopic precautions.   Informed patient. Reviewed pelvic rest and ectopic precautions. Appointment rescheduled from 07/03/24 to tomorrow. Patient v/u and agreed.

## 2024-06-26 NOTE — TELEPHONE ENCOUNTER
See phone call note from 06/24/24.  Attempted to return patient's call. Left voice message to call us back.

## 2024-06-27 ENCOUNTER — OFFICE VISIT (OUTPATIENT)
Dept: OBSTETRICS AND GYNECOLOGY | Facility: CLINIC | Age: 24
End: 2024-06-27
Payer: COMMERCIAL

## 2024-06-27 VITALS
SYSTOLIC BLOOD PRESSURE: 98 MMHG | HEIGHT: 62 IN | WEIGHT: 107 LBS | DIASTOLIC BLOOD PRESSURE: 60 MMHG | BODY MASS INDEX: 19.69 KG/M2

## 2024-06-27 DIAGNOSIS — O20.0 THREATENED ABORTION: Primary | ICD-10-CM

## 2024-06-27 PROCEDURE — 99203 OFFICE O/P NEW LOW 30 MIN: CPT | Performed by: NURSE PRACTITIONER

## 2024-06-27 NOTE — PROGRESS NOTES
Chief Complaint   Patient presents with    Threatened Miscarriage          HPI  Oksana Aguirre is a 23 y.o. female, , who presents with: reports that she started having thick brown vaginal discharge two days ago. A few hours later, she started having heavy bright red vaginal bleeding that lasted an hour. She reports bleeding into the toilet. She then put a pad on and only partially saturated it.   Yesterday she was still having the thick brown discharge. Reports that she was having intermittent lower abdominal cramping and pressure.   Denied passing any tissue or large clots. She had previously been seen in the ED on 24 for vaginal bleeding and cramping. She denies spotting today but she does still continue to have intermittent cramping today.  MBT is A positive.       Recent Tests:  She had a urine pregnancy test that was done last week that was positive  US today: Yes.  Findings showed SIUP measuring 5w 5d which is consistent with LMP. Normal GS with YS. Too early to see FP.   I have reviewed the preliminary US report. Final report pending physician review. Lan Myers, REJI  She has not had prenatal care.  She complains of cramping pain.  The pain is located in her lower abdomen.. Her past medical history is non-contributory.  She does not have passage of tissue.  Rh Status: Positive  She reports no additional symptoms or complaints.    The additional following portions of the patient's history were reviewed and updated as appropriate: allergies, current medications, past family history, past medical history, past social history, past surgical history, and problem list.    Review of Systems   Constitutional: Negative.    HENT: Negative.     Eyes: Negative.    Respiratory: Negative.     Cardiovascular: Negative.    Gastrointestinal: Negative.    Endocrine: Negative.    Genitourinary: Negative.  Positive for pelvic pressure and vaginal bleeding.   Musculoskeletal: Negative.    Skin:  "Negative.    Allergic/Immunologic: Negative.    Neurological: Negative.    Hematological: Negative.    Psychiatric/Behavioral: Negative.       All other systems reviewed and are negative.     I have reviewed and agree with the HPI, ROS, and historical information as entered above. Lan Myers, APRN      Objective   BP 98/60 (BP Location: Right arm, Patient Position: Sitting, Cuff Size: Adult)   Ht 157.5 cm (62\")   Wt 48.5 kg (107 lb)   LMP 2024 (Exact Date)   Breastfeeding No   BMI 19.57 kg/m²     Physical Exam  Vitals and nursing note reviewed.   Constitutional:       Appearance: Normal appearance. She is well-developed.   HENT:      Head: Normocephalic and atraumatic.   Cardiovascular:      Rate and Rhythm: Normal rate and regular rhythm.   Pulmonary:      Effort: Pulmonary effort is normal.      Breath sounds: Normal breath sounds.   Abdominal:      Palpations: Abdomen is soft. Abdomen is not rigid.   Musculoskeletal:      Cervical back: Normal range of motion.   Neurological:      Mental Status: She is alert and oriented to person, place, and time.   Psychiatric:         Behavior: Behavior normal.            Assessment and Plan    Problem List Items Addressed This Visit    None  Visit Diagnoses       Threatened     -  Primary    Relevant Orders    HCG, B-subunit, Quantitative            Threatened AB  Repeat U/S in 1 week(s).  Pelvic Rest.  No douching, intercourse or use of tampons.  Call for an increase in bleeding, abdominal pain, or fever.  Report to ED if saturating a pad greater than every 30-60 mins.  Repeat Hcg today  Return in about 6 days (around 7/3/2024).          Lan Myers, REJI  2024    "

## 2024-06-28 LAB — HCG INTACT+B SERPL-ACNC: NORMAL MIU/ML

## 2024-07-01 ENCOUNTER — TELEPHONE (OUTPATIENT)
Dept: OBSTETRICS AND GYNECOLOGY | Facility: CLINIC | Age: 24
End: 2024-07-01

## 2024-07-01 NOTE — TELEPHONE ENCOUNTER
Hub staff attempted to follow warm transfer process and was unsuccessful     Caller: Oksana Aguirre    Relationship to patient: Self    Best call back number: [phone]    Patient is needing: PT CALLED IN TO RESCHEDULE HER CANCELLED APPT 07/02. OUTSIDE OF FOLLOW UP TIMEFRAME

## 2024-07-03 ENCOUNTER — OFFICE VISIT (OUTPATIENT)
Dept: OBSTETRICS AND GYNECOLOGY | Facility: CLINIC | Age: 24
End: 2024-07-03
Payer: COMMERCIAL

## 2024-07-03 VITALS
BODY MASS INDEX: 21.42 KG/M2 | HEIGHT: 62 IN | DIASTOLIC BLOOD PRESSURE: 60 MMHG | WEIGHT: 116.4 LBS | SYSTOLIC BLOOD PRESSURE: 108 MMHG

## 2024-07-03 DIAGNOSIS — O20.0 THREATENED ABORTION: Primary | ICD-10-CM

## 2024-07-03 LAB
BILIRUB BLD-MCNC: NEGATIVE MG/DL
CLARITY, POC: CLEAR
COLOR UR: YELLOW
GLUCOSE UR STRIP-MCNC: NEGATIVE MG/DL
KETONES UR QL: NEGATIVE
LEUKOCYTE EST, POC: NEGATIVE
NITRITE UR-MCNC: NEGATIVE MG/ML
PH UR: 6.5 [PH] (ref 5–8)
PROT UR STRIP-MCNC: NEGATIVE MG/DL
RBC # UR STRIP: NEGATIVE /UL
SP GR UR: 1.01 (ref 1–1.03)
UROBILINOGEN UR QL: NORMAL

## 2024-07-03 NOTE — PROGRESS NOTES
"    Chief Complaint   Patient presents with    TAB    Follow-up          HPI  Oksana Aguirre is a 23 y.o. female, , who presents with follow up for a TAB. Patient states that her bleeding is down to intermittent spotting. Sometimes spotting is more red and sometimes it is more brown in color. Patient denies any pain or cramping. She reports either really constipated or diarrhea since pregnancy.     Recent Tests:  She  had a recent hcg level drawn on 2024 .    US today: Yes.  Findings showed FHR 85, CRL 2.6mm/5w6d/<1%. Placenta too early to evaluate. L ov cor lut, AF nml.  I have personally evaluated the U/S and agree with the findings.   She has not had prenatal care.  She denies associated abdominal or pelvic pain.. Her past medical history is non-contributory.  She does not have passage of tissue.  Rh Status: Positive  She reports no additional symptoms or complaints.    The additional following portions of the patient's history were reviewed and updated as appropriate: allergies, current medications, past family history, past medical history, past social history, past surgical history, and problem list.    Review of Systems   Respiratory: Negative.  Negative for shortness of breath.    Cardiovascular: Negative.  Negative for chest pain.   Gastrointestinal:  Positive for constipation, diarrhea and nausea. Negative for abdominal distention, abdominal pain and vomiting.   Genitourinary:  Positive for vaginal bleeding. Negative for breast discharge, breast lump, breast pain, dysuria, pelvic pain, pelvic pressure, urinary incontinence, vaginal discharge and vaginal pain.     All other systems reviewed and are negative.     I have reviewed and agree with the HPI, ROS, and historical information as entered above. Ibis Bradley, APRN      Objective   /60   Ht 157.5 cm (62\")   Wt 52.8 kg (116 lb 6.4 oz)   LMP 2024 (Exact Date)   BMI 21.29 kg/m²     Physical Exam  Vitals and nursing note " reviewed.   Constitutional:       General: She is not in acute distress.     Appearance: Normal appearance. She is not ill-appearing or toxic-appearing.   HENT:      Head: Normocephalic and atraumatic.   Pulmonary:      Effort: Pulmonary effort is normal.   Abdominal:      Palpations: Abdomen is soft. There is no mass.      Tenderness: There is abdominal tenderness in the right upper quadrant, right lower quadrant, left upper quadrant and left lower quadrant.      Hernia: No hernia is present.   Neurological:      Mental Status: She is alert and oriented to person, place, and time.   Psychiatric:         Mood and Affect: Mood normal.            Assessment and Plan    Problem List Items Addressed This Visit    None  Visit Diagnoses       Threatened     -  Primary    Relevant Orders    US Non-ob Transvaginal    POC Urinalysis Dipstick (Completed)    US Non-ob Transvaginal            Threatened AB  TVUS results reviewed with patient.   Repeat U/S in 1 week(s).  Pelvic Rest.  No douching, intercourse or use of tampons.  Call for an increase in bleeding, abdominal pain, or fever.  Report to ED if saturating a pad greater than every 30-60 mins.  Nausea- Patient advised to take Unisom (12.5mg-25mg) with VitB6 12.5mg-25mg in the morning and mid afternoon if needed. At bedtime, can increase intake to 2 each if needed. Drink plenty of fluids to stay hydrated. May drink electrolyte fuilds as well. Eat frequent small meals Q1-2h, bland or dry foods, and high protein meals or snacks.  Diarrhea- Imodium PRN  Constipation- Advised to improve constipation by drinking at least 80oz of water daily, increasing fiber in diet, beginning stool softener 1-2x/day, and Miralax PRN.  Tylenol sparingly prn pain.  Return in about 1 week (around 7/10/2024) for Ultrasound- F/U TAB.    Counseling was given to patient for the following topics: importance of treatment compliance . Total time of the encounter was 30 minutes and 30 minutes  was spend counseling.      Ibis Bradley, APRN  07/03/2024

## 2024-07-10 ENCOUNTER — INITIAL PRENATAL (OUTPATIENT)
Dept: OBSTETRICS AND GYNECOLOGY | Facility: CLINIC | Age: 24
End: 2024-07-10
Payer: COMMERCIAL

## 2024-07-10 VITALS — SYSTOLIC BLOOD PRESSURE: 100 MMHG | DIASTOLIC BLOOD PRESSURE: 58 MMHG | BODY MASS INDEX: 21.62 KG/M2 | WEIGHT: 118.2 LBS

## 2024-07-10 DIAGNOSIS — Z3A.01 LESS THAN 8 WEEKS GESTATION OF PREGNANCY: ICD-10-CM

## 2024-07-10 DIAGNOSIS — Z34.81 MULTIGRAVIDA IN FIRST TRIMESTER: Primary | ICD-10-CM

## 2024-07-10 PROBLEM — Z34.90 PREGNANCY: Status: ACTIVE | Noted: 2024-07-10

## 2024-07-10 LAB
BILIRUB BLD-MCNC: NEGATIVE MG/DL
CLARITY, POC: CLEAR
COLOR UR: YELLOW
GLUCOSE UR STRIP-MCNC: NEGATIVE MG/DL
KETONES UR QL: NEGATIVE
LEUKOCYTE EST, POC: NEGATIVE
NITRITE UR-MCNC: NEGATIVE MG/ML
PH UR: 6 [PH] (ref 5–8)
PROT UR STRIP-MCNC: NEGATIVE MG/DL
RBC # UR STRIP: NEGATIVE /UL
SP GR UR: 1.02 (ref 1–1.03)
UROBILINOGEN UR QL: NORMAL

## 2024-07-10 NOTE — PROGRESS NOTES
Initial ob visit     CC- Here for care of pregnancy        Oksana Aguirre is a 23 y.o. female, , who presents for her first obstetrical visit.  Patient's last menstrual period was 2024 (exact date). Her MERVAT is 2025, by Last Menstrual Period. Current GA is 7w3d.     Initial positive test date : 06/10/2024, UPT        Her periods are every 27 days.  Prior obstetric issues: postpartum preeclampsia  Patient's past medical history is significant for:  no .  Family history of genetic issues (includes FOB): none  Prior infections concerning in pregnancy (Rash, fever in last 2 weeks): No  Varicella Hx - vaccinated  Prior testing for Cystic Fibrosis Carrier or Sickle Cell Trait- no  Prepregnancy BMI - Body mass index is 21.62 kg/m².  History of STD: yes gonorrhea   Hx of HSV for patient or partner: no  Ultrasound Today: yes. , CRL 11.7mm/7w2d/78%, placenta too early, AF nml    OB History    Para Term  AB Living   2 1 1     1   SAB IAB Ectopic Molar Multiple Live Births             1      # Outcome Date GA Lbr Elijah/2nd Weight Sex Type Anes PTL Lv   2 Current            1 Term 17 40w1d   M Vag-Spont   SASHA      Obstetric Comments   G1-was adopted       Additional Pertinent History   Last Pap :  Result: negative HPV: unknown      Last Completed Pap Smear       This patient has no relevant Health Maintenance data.          History of abnormal Pap smear: no  Family history of uterine, colon, breast, or ovarian cancer: yes - maternal great grandmother had breast cancer  Feelings of Anxiety or Depression: no  Tobacco Usage?: No   Alcohol/Drug Use?: NO  Over the age of 35 at delivery: no  Genetic Screening: desires to discuss with provider  Flu Status: Declines    PMH    Current Outpatient Medications:     PRENATAL VIT W/ FE BISG-FA PO, Take  by mouth., Disp: , Rfl:      Past Medical History:   Diagnosis Date    History of palpitations     Hypertension     Low back pain      I was diagnosed wih scolisos    Migraine     Scoliosis     Syrinx of spinal cord 04/2018    thoracic region, as seen on MRI        Past Surgical History:   Procedure Laterality Date    ADENOIDECTOMY      DENTAL PROCEDURE  06/2024    for impacted teeth    EAR TUBES Bilateral     INNER EAR SURGERY Right        Review of Systems   Review of Systems   Respiratory: Negative.  Negative for shortness of breath.    Cardiovascular: Negative.  Negative for chest pain.   Gastrointestinal: Negative.  Negative for abdominal distention, abdominal pain, constipation, nausea and vomiting.   Genitourinary: Negative.  Negative for breast discharge, breast lump, breast pain, dysuria, menstrual problem, pelvic pain, pelvic pressure, urinary incontinence, vaginal bleeding, vaginal discharge and vaginal pain.   Psychiatric/Behavioral: Negative.  Negative for depressed mood. The patient is not nervous/anxious.        Patient Reports:  none  Patient Denies: vaginal bleeding, excessive nausea and vomiting  All systems reviewed and otherwise normal.    I have reviewed and agree with the HPI, ROS, and historical information as entered above. Ibis Bradley, APRN      /58   Wt 53.6 kg (118 lb 3.2 oz)   LMP 05/19/2024 (Exact Date)   BMI 21.62 kg/m²     The additional following portions of the patient's history were reviewed and updated as appropriate: allergies, current medications, past family history, past medical history, past social history, past surgical history, and problem list.    Physical Exam  General:  well developed; well nourished  no acute distress   Chest/Respiratory: No labored breathing, normal respiratory effort, normal appearance, no respiratory noises noted   Heart:  normal rate, regular rhythm,  no murmurs, rubs, or gallops   Thyroid: normal to inspection and palpation   Breasts:  Not performed.   Abdomen: soft, non-tender; no masses  no umbilical or inguinal hernias are present  no hepato-splenomegaly   Pelvis:  Not performed.        Assessment and Plan    Problem List Items Addressed This Visit       Preeclampsia in postpartum period    Overview     G1  Advised to begin [id number] 10wks-delivery  7/10/2024- Baseline PEP, 24 hour urine         Relevant Orders    AlP+ALT+AST+Creat+LD+TBili+..    Protein, Urine, 24 Hour - Urine, Clean Catch    Pregnancy    Overview     (Male, 8#9oz, Vag, spontaneous), Postpartum preeclampsia.          Relevant Orders    POC Urinalysis Dipstick (Completed)    Obstetric Panel    HIV-1 / O / 2 Ag / Antibody    Urine Culture - Urine, Urine, Clean Catch    Urinalysis With Microscopic - Urine, Clean Catch    Chlamydia trachomatis, Neisseria gonorrhoeae, PCR - Urine, Urine, Clean Catch    Urine Drug Screen - Urine, Clean Catch    AlP+ALT+AST+Creat+LD+TBili+..    Protein, Urine, 24 Hour - Urine, Clean Catch     Other Visit Diagnoses       Multigravida in first trimester    -  Primary    Relevant Orders    POC Urinalysis Dipstick (Completed)            Pregnancy at 7w3d  Reviewed routine prenatal care with the office and educational materials given  Discussed options for genetic testing including first trimester nuchal translucency screen, genetic disease carrier testing, quadruple screen, and NIPT  Discontinue the use of all non-medicinal drugs and chemicals  Patient is on Prenatal vitamins  Activity recommendation : 150 minutes/week of moderate intensity aerobic activity unless we limit for bleeding, hypertension or other pregnancy complication   Recommend limiting weight gain to 15 to 20 pounds in pregnancy.   Discussed carbohydrate control.   discussed baby aspirin from 10-36 weeks for prevention of preeclampsia   baseline 24 hour urine protein recommended. Instructions and equipment given to return sample at next visit.  baseline PEP today  Pap next visit  Return in about 4 weeks (around 2024) for Abbe Waller.      Ibis Bradley, REJI  07/10/2024

## 2024-07-11 LAB
ABO GROUP BLD: ABNORMAL
ALP SERPL-CCNC: 65 IU/L (ref 44–121)
ALT SERPL-CCNC: 15 IU/L (ref 0–32)
AMPHETAMINES UR QL SCN: NEGATIVE NG/ML
APPEARANCE UR: ABNORMAL
AST SERPL-CCNC: 23 IU/L (ref 0–40)
BACTERIA #/AREA URNS HPF: ABNORMAL /[HPF]
BARBITURATES UR QL SCN: NEGATIVE NG/ML
BASOPHILS # BLD AUTO: 0 X10E3/UL (ref 0–0.2)
BASOPHILS NFR BLD AUTO: 1 %
BENZODIAZ UR QL SCN: NEGATIVE NG/ML
BILIRUB SERPL-MCNC: 0.3 MG/DL (ref 0–1.2)
BILIRUB UR QL STRIP: NEGATIVE
BLD GP AB SCN SERPL QL: NEGATIVE
BZE UR QL SCN: NEGATIVE NG/ML
CANNABINOIDS UR QL SCN: POSITIVE NG/ML
CASTS URNS QL MICRO: ABNORMAL /LPF
COLOR UR: YELLOW
CREAT SERPL-MCNC: 0.62 MG/DL (ref 0.57–1)
CREAT UR-MCNC: 194.4 MG/DL (ref 20–300)
EGFRCR SERPLBLD CKD-EPI 2021: 127 ML/MIN/1.73
EOSINOPHIL # BLD AUTO: 0.1 X10E3/UL (ref 0–0.4)
EOSINOPHIL NFR BLD AUTO: 1 %
EPI CELLS #/AREA URNS HPF: >10 /HPF (ref 0–10)
ERYTHROCYTE [DISTWIDTH] IN BLOOD BY AUTOMATED COUNT: 12.4 % (ref 11.7–15.4)
GLUCOSE UR QL STRIP: NEGATIVE
HBV SURFACE AG SERPL QL IA: NEGATIVE
HCT VFR BLD AUTO: 39 % (ref 34–46.6)
HCV IGG SERPL QL IA: NON REACTIVE
HGB BLD-MCNC: 12.5 G/DL (ref 11.1–15.9)
HGB UR QL STRIP: NEGATIVE
HIV 1+2 AB+HIV1 P24 AG SERPL QL IA: NON REACTIVE
IMM GRANULOCYTES # BLD AUTO: 0 X10E3/UL (ref 0–0.1)
IMM GRANULOCYTES NFR BLD AUTO: 0 %
KETONES UR QL STRIP: NEGATIVE
LABORATORY COMMENT REPORT: ABNORMAL
LDH SERPL L TO P-CCNC: 165 IU/L (ref 119–226)
LEUKOCYTE ESTERASE UR QL STRIP: NEGATIVE
LYMPHOCYTES # BLD AUTO: 1.9 X10E3/UL (ref 0.7–3.1)
LYMPHOCYTES NFR BLD AUTO: 25 %
MCH RBC QN AUTO: 29.6 PG (ref 26.6–33)
MCHC RBC AUTO-ENTMCNC: 32.1 G/DL (ref 31.5–35.7)
MCV RBC AUTO: 92 FL (ref 79–97)
METHADONE UR QL SCN: NEGATIVE NG/ML
MICRO URNS: ABNORMAL
MICRO URNS: ABNORMAL
MONOCYTES # BLD AUTO: 0.7 X10E3/UL (ref 0.1–0.9)
MONOCYTES NFR BLD AUTO: 9 %
MUCOUS THREADS URNS QL MICRO: PRESENT
NEUTROPHILS # BLD AUTO: 4.7 X10E3/UL (ref 1.4–7)
NEUTROPHILS NFR BLD AUTO: 64 %
NITRITE UR QL STRIP: NEGATIVE
OPIATES UR QL SCN: NEGATIVE NG/ML
OXYCODONE+OXYMORPHONE UR QL SCN: NEGATIVE NG/ML
PCP UR QL: NEGATIVE NG/ML
PH UR STRIP: 6 [PH] (ref 5–7.5)
PH UR: 5.8 [PH] (ref 4.5–8.9)
PLATELET # BLD AUTO: 295 X10E3/UL (ref 150–450)
PROPOXYPH UR QL SCN: NEGATIVE NG/ML
PROT UR QL STRIP: NEGATIVE
RBC # BLD AUTO: 4.23 X10E6/UL (ref 3.77–5.28)
RBC #/AREA URNS HPF: ABNORMAL /HPF (ref 0–2)
RH BLD: POSITIVE
RPR SER QL: NON REACTIVE
RUBV IGG SERPL IA-ACNC: <0.9 INDEX
SP GR UR STRIP: 1.02 (ref 1–1.03)
URATE SERPL-MCNC: 2.4 MG/DL (ref 2.6–6.2)
UROBILINOGEN UR STRIP-MCNC: 0.2 MG/DL (ref 0.2–1)
WBC # BLD AUTO: 7.3 X10E3/UL (ref 3.4–10.8)
WBC #/AREA URNS HPF: ABNORMAL /HPF (ref 0–5)

## 2024-07-12 PROBLEM — O09.899 RUBELLA NON-IMMUNE STATUS, ANTEPARTUM: Status: ACTIVE | Noted: 2024-07-12

## 2024-07-12 PROBLEM — F12.10 TETRAHYDROCANNABINOL (THC) USE DISORDER, MILD, ABUSE: Status: ACTIVE | Noted: 2024-07-12

## 2024-07-12 PROBLEM — Z28.39 RUBELLA NON-IMMUNE STATUS, ANTEPARTUM: Status: ACTIVE | Noted: 2024-07-12

## 2024-07-12 LAB
BACTERIA UR CULT: NO GROWTH
BACTERIA UR CULT: NORMAL
C TRACH RRNA SPEC QL NAA+PROBE: NEGATIVE
N GONORRHOEA RRNA SPEC QL NAA+PROBE: NEGATIVE

## 2024-09-03 NOTE — DISCHARGE INSTRUCTIONS
Rest.  Plenty of fluids.  Omnicef as prescribed for UTI.  Follow up with your PCP (call Monday for next available appointment).  Please call ASAP to arrange outpatient follow up with one of the following gastroenterologists:    Jcarlos Hoyt MD or Alexandro Cartwright MD  [address]  [place], [place] [id number] [phone]    Or    If unable to make a timely appointment with Dr. Hoyt or Dr. Cartwright, please follow up with one of these gastroenterologists:    Shaggy Sharma MD or Price Keenan MD  [address]  [place] [address] [phone]    If follow up with these specialists cannot be arranged soon, please also follow up with a primary care provider, next available.     all other ROS negative except as per HPI

## 2024-11-26 ENCOUNTER — HOSPITAL ENCOUNTER (EMERGENCY)
Facility: HOSPITAL | Age: 24
Discharge: HOME | End: 2024-11-26
Attending: EMERGENCY MEDICINE | Admitting: EMERGENCY MEDICINE
Payer: COMMERCIAL

## 2024-11-26 VITALS
BODY MASS INDEX: 21.16 KG/M2 | HEART RATE: 112 BPM | WEIGHT: 115 LBS | DIASTOLIC BLOOD PRESSURE: 88 MMHG | TEMPERATURE: 98.2 F | RESPIRATION RATE: 16 BRPM | OXYGEN SATURATION: 97 % | SYSTOLIC BLOOD PRESSURE: 115 MMHG | HEIGHT: 62 IN

## 2024-11-26 DIAGNOSIS — R11.2 NAUSEA AND VOMITING, UNSPECIFIED VOMITING TYPE: Primary | ICD-10-CM

## 2024-11-26 LAB
ALBUMIN SERPL-MCNC: 4.5 G/DL (ref 3.5–5.2)
ALBUMIN/GLOB SERPL: 1.5 G/DL
ALP SERPL-CCNC: 77 U/L (ref 39–117)
ALT SERPL W P-5'-P-CCNC: 12 U/L (ref 1–33)
ANION GAP SERPL CALCULATED.3IONS-SCNC: 11 MMOL/L (ref 5–15)
AST SERPL-CCNC: 21 U/L (ref 1–32)
B-HCG UR QL: NEGATIVE
BACTERIA UR QL AUTO: ABNORMAL /HPF
BASOPHILS # BLD AUTO: 0.03 10*3/MM3 (ref 0–0.2)
BASOPHILS NFR BLD AUTO: 0.4 % (ref 0–1.5)
BILIRUB SERPL-MCNC: 0.4 MG/DL (ref 0–1.2)
BILIRUB UR QL STRIP: NEGATIVE
BUN SERPL-MCNC: 10 MG/DL (ref 6–20)
BUN/CREAT SERPL: 15.6 (ref 7–25)
CALCIUM SPEC-SCNC: 9 MG/DL (ref 8.6–10.5)
CHLORIDE SERPL-SCNC: 104 MMOL/L (ref 98–107)
CLARITY UR: ABNORMAL
CO2 SERPL-SCNC: 25 MMOL/L (ref 22–29)
COLOR UR: YELLOW
CREAT SERPL-MCNC: 0.64 MG/DL (ref 0.57–1)
DEPRECATED RDW RBC AUTO: 42 FL (ref 37–54)
EGFRCR SERPLBLD CKD-EPI 2021: 126 ML/MIN/1.73
EOSINOPHIL # BLD AUTO: 0.01 10*3/MM3 (ref 0–0.4)
EOSINOPHIL NFR BLD AUTO: 0.1 % (ref 0.3–6.2)
ERYTHROCYTE [DISTWIDTH] IN BLOOD BY AUTOMATED COUNT: 12.8 % (ref 12.3–15.4)
EXPIRATION DATE: NORMAL
GLOBULIN UR ELPH-MCNC: 3.1 GM/DL
GLUCOSE SERPL-MCNC: 86 MG/DL (ref 65–99)
GLUCOSE UR STRIP-MCNC: NEGATIVE MG/DL
HCT VFR BLD AUTO: 42.4 % (ref 34–46.6)
HGB BLD-MCNC: 13.9 G/DL (ref 12–15.9)
HGB UR QL STRIP.AUTO: NEGATIVE
HYALINE CASTS UR QL AUTO: ABNORMAL /LPF
IMM GRANULOCYTES # BLD AUTO: 0.01 10*3/MM3 (ref 0–0.05)
IMM GRANULOCYTES NFR BLD AUTO: 0.1 % (ref 0–0.5)
INTERNAL NEGATIVE CONTROL: NEGATIVE
INTERNAL POSITIVE CONTROL: POSITIVE
KETONES UR QL STRIP: ABNORMAL
LEUKOCYTE ESTERASE UR QL STRIP.AUTO: ABNORMAL
LIPASE SERPL-CCNC: 18 U/L (ref 13–60)
LYMPHOCYTES # BLD AUTO: 1.43 10*3/MM3 (ref 0.7–3.1)
LYMPHOCYTES NFR BLD AUTO: 16.9 % (ref 19.6–45.3)
Lab: NORMAL
MCH RBC QN AUTO: 29.6 PG (ref 26.6–33)
MCHC RBC AUTO-ENTMCNC: 32.8 G/DL (ref 31.5–35.7)
MCV RBC AUTO: 90.4 FL (ref 79–97)
MONOCYTES # BLD AUTO: 0.81 10*3/MM3 (ref 0.1–0.9)
MONOCYTES NFR BLD AUTO: 9.6 % (ref 5–12)
MUCOUS THREADS URNS QL MICRO: ABNORMAL /HPF
NEUTROPHILS NFR BLD AUTO: 6.19 10*3/MM3 (ref 1.7–7)
NEUTROPHILS NFR BLD AUTO: 72.9 % (ref 42.7–76)
NITRITE UR QL STRIP: NEGATIVE
NRBC BLD AUTO-RTO: 0 /100 WBC (ref 0–0.2)
PH UR STRIP.AUTO: >=9 [PH] (ref 5–8)
PLATELET # BLD AUTO: 269 10*3/MM3 (ref 140–450)
PMV BLD AUTO: 10.4 FL (ref 6–12)
POTASSIUM SERPL-SCNC: 4.3 MMOL/L (ref 3.5–5.2)
PROT SERPL-MCNC: 7.6 G/DL (ref 6–8.5)
PROT UR QL STRIP: ABNORMAL
RBC # BLD AUTO: 4.69 10*6/MM3 (ref 3.77–5.28)
RBC # UR STRIP: ABNORMAL /HPF
REF LAB TEST METHOD: ABNORMAL
SODIUM SERPL-SCNC: 140 MMOL/L (ref 136–145)
SP GR UR STRIP: 1.02 (ref 1–1.03)
SQUAMOUS #/AREA URNS HPF: ABNORMAL /HPF
UROBILINOGEN UR QL STRIP: ABNORMAL
WBC # UR STRIP: ABNORMAL /HPF
WBC NRBC COR # BLD AUTO: 8.48 10*3/MM3 (ref 3.4–10.8)

## 2024-11-26 PROCEDURE — 81001 URINALYSIS AUTO W/SCOPE: CPT | Performed by: NURSE PRACTITIONER

## 2024-11-26 PROCEDURE — 85025 COMPLETE CBC W/AUTO DIFF WBC: CPT | Performed by: NURSE PRACTITIONER

## 2024-11-26 PROCEDURE — 96374 THER/PROPH/DIAG INJ IV PUSH: CPT

## 2024-11-26 PROCEDURE — 25010000002 ONDANSETRON PER 1 MG: Performed by: NURSE PRACTITIONER

## 2024-11-26 PROCEDURE — 83690 ASSAY OF LIPASE: CPT | Performed by: NURSE PRACTITIONER

## 2024-11-26 PROCEDURE — 25810000003 SODIUM CHLORIDE 0.9 % SOLUTION: Performed by: NURSE PRACTITIONER

## 2024-11-26 PROCEDURE — 80053 COMPREHEN METABOLIC PANEL: CPT | Performed by: NURSE PRACTITIONER

## 2024-11-26 PROCEDURE — 81025 URINE PREGNANCY TEST: CPT | Performed by: NURSE PRACTITIONER

## 2024-11-26 PROCEDURE — 99283 EMERGENCY DEPT VISIT LOW MDM: CPT

## 2024-11-26 RX ORDER — SODIUM CHLORIDE 0.9 % (FLUSH) 0.9 %
10 SYRINGE (ML) INJECTION AS NEEDED
Status: DISCONTINUED | OUTPATIENT
Start: 2024-11-26 | End: 2024-11-26 | Stop reason: HOSPADM

## 2024-11-26 RX ORDER — ONDANSETRON 2 MG/ML
4 INJECTION INTRAMUSCULAR; INTRAVENOUS ONCE
Status: COMPLETED | OUTPATIENT
Start: 2024-11-26 | End: 2024-11-26

## 2024-11-26 RX ORDER — ONDANSETRON 4 MG/1
4 TABLET, ORALLY DISINTEGRATING ORAL 4 TIMES DAILY PRN
Qty: 16 TABLET | Refills: 0 | Status: SHIPPED | OUTPATIENT
Start: 2024-11-26

## 2024-11-26 RX ADMIN — ONDANSETRON 4 MG: 2 INJECTION INTRAMUSCULAR; INTRAVENOUS at 17:18

## 2024-11-26 RX ADMIN — SODIUM CHLORIDE 1000 ML: 9 INJECTION, SOLUTION INTRAVENOUS at 17:18

## 2024-11-26 NOTE — Clinical Note
[facility] EMERGENCY DEPARTMENT  [address]  [facility] [address]  Phone: [phone]    Oksana Aguirre was seen and treated in our emergency department on 11/26/2024.  She may return to work on 11/27/2024.         Thank you for choosing [facility].    Fern Mcgrath, REJI

## 2024-11-26 NOTE — ED PROVIDER NOTES
EMERGENCY DEPARTMENT ENCOUNTER    Pt Name: Oksana Aguirre  MRN: [id number]  Pt :   2000  Room Number:  RW2/R2  Date of encounter:  2024  PCP: Rizwana Odonnell APRN  ED Provider: REJI Matamoros    Historian: Patient    HPI:  Chief Complaint:  N/V    Context: Oksana Aguirre is a 24 y.o. female who presents to the ED c/o nausea and vomiting.  Patient advises that she went out drinking on  night.  Since she has had nausea, vomiting.  Patient complains of feeling generally weak. Pt c/o a discomfort in the epigastric region.  Patient reports minimal urine output.  HPI     REVIEW OF SYSTEMS  A chief complaint appropriate review of systems was completed and is negative except as noted in the HPI.     PAST MEDICAL HISTORY  Past Medical History:   Diagnosis Date    History of palpitations     Hypertension     Low back pain     I was diagnosed wih scolisos    Migraine     Scoliosis     Syrinx of spinal cord 2018    thoracic region, as seen on MRI       PAST SURGICAL HISTORY  Past Surgical History:   Procedure Laterality Date    ADENOIDECTOMY      DENTAL PROCEDURE  2024    for impacted teeth    EAR TUBES Bilateral     INNER EAR SURGERY Right        FAMILY HISTORY  Family History   Problem Relation Age of Onset    Alcohol abuse Father     Crohn's disease Mother     Parkinsonism Paternal Grandfather     Hypertension Maternal Grandmother     Anemia Maternal Grandmother     Cancer Maternal Grandmother     Cancer Maternal Grandfather        SOCIAL HISTORY  Social History     Socioeconomic History    Marital status: Significant Other   Tobacco Use    Smoking status: Never    Smokeless tobacco: Never   Vaping Use    Vaping status: Never Used   Substance and Sexual Activity    Alcohol use: Not Currently    Drug use: No    Sexual activity: Not Currently     Partners: Male       ALLERGIES  Doxycycline    PHYSICAL EXAM  Physical Exam  Vitals and nursing note reviewed.   Constitutional:        General: She is not in acute distress.     Appearance: Normal appearance. She is not ill-appearing.   HENT:      Head: Normocephalic and atraumatic.      Nose: Nose normal.   Eyes:      Extraocular Movements: Extraocular movements intact.      Conjunctiva/sclera: Conjunctivae normal.   Cardiovascular:      Rate and Rhythm: Regular rhythm. Tachycardia present.      Heart sounds: Normal heart sounds.   Pulmonary:      Effort: Pulmonary effort is normal. No respiratory distress.      Breath sounds: Normal breath sounds.   Abdominal:      General: Bowel sounds are normal. There is no distension.      Tenderness: There is no abdominal tenderness.   Musculoskeletal:         General: Normal range of motion.      Cervical back: Normal range of motion and neck supple.   Skin:     General: Skin is warm and dry.   Neurological:      General: No focal deficit present.      Mental Status: She is alert and oriented to person, place, and time.   Psychiatric:         Mood and Affect: Mood normal.         Behavior: Behavior normal.           LAB RESULTS  Results for orders placed or performed during the hospital encounter of 11/26/24   Comprehensive Metabolic Panel    Collection Time: 11/26/24  5:04 PM    Specimen: Blood   Result Value Ref Range    Glucose 86 65 - 99 mg/dL    BUN 10 6 - 20 mg/dL    Creatinine 0.64 0.57 - 1.00 mg/dL    Sodium 140 136 - 145 mmol/L    Potassium 4.3 3.5 - 5.2 mmol/L    Chloride 104 98 - 107 mmol/L    CO2 25.0 22.0 - 29.0 mmol/L    Calcium 9.0 8.6 - 10.5 mg/dL    Total Protein 7.6 6.0 - 8.5 g/dL    Albumin 4.5 3.5 - 5.2 g/dL    ALT (SGPT) 12 1 - 33 U/L    AST (SGOT) 21 1 - 32 U/L    Alkaline Phosphatase 77 39 - 117 U/L    Total Bilirubin 0.4 0.0 - 1.2 mg/dL    Globulin 3.1 gm/dL    A/G Ratio 1.5 g/dL    BUN/Creatinine Ratio 15.6 7.0 - 25.0    Anion Gap 11.0 5.0 - 15.0 mmol/L    eGFR 126.0 >60.0 mL/min/1.73   Lipase    Collection Time: 11/26/24  5:04 PM    Specimen: Blood   Result Value Ref Range     Lipase 18 13 - 60 U/L   CBC Auto Differential    Collection Time: 11/26/24  5:04 PM    Specimen: Blood   Result Value Ref Range    WBC 8.48 3.40 - 10.80 10*3/mm3    RBC 4.69 3.77 - 5.28 10*6/mm3    Hemoglobin 13.9 12.0 - 15.9 g/dL    Hematocrit 42.4 34.0 - 46.6 %    MCV 90.4 79.0 - 97.0 fL    MCH 29.6 26.6 - 33.0 pg    MCHC 32.8 31.5 - 35.7 g/dL    RDW 12.8 12.3 - 15.4 %    RDW-SD 42.0 37.0 - 54.0 fl    MPV 10.4 6.0 - 12.0 fL    Platelets 269 140 - 450 10*3/mm3    Neutrophil % 72.9 42.7 - 76.0 %    Lymphocyte % 16.9 (L) 19.6 - 45.3 %    Monocyte % 9.6 5.0 - 12.0 %    Eosinophil % 0.1 (L) 0.3 - 6.2 %    Basophil % 0.4 0.0 - 1.5 %    Immature Grans % 0.1 0.0 - 0.5 %    Neutrophils, Absolute 6.19 1.70 - 7.00 10*3/mm3    Lymphocytes, Absolute 1.43 0.70 - 3.10 10*3/mm3    Monocytes, Absolute 0.81 0.10 - 0.90 10*3/mm3    Eosinophils, Absolute 0.01 0.00 - 0.40 10*3/mm3    Basophils, Absolute 0.03 0.00 - 0.20 10*3/mm3    Immature Grans, Absolute 0.01 0.00 - 0.05 10*3/mm3    nRBC 0.0 0.0 - 0.2 /100 WBC   Urinalysis With Microscopic If Indicated (No Culture) - Urine, Clean Catch    Collection Time: 11/26/24  5:10 PM    Specimen: Urine, Clean Catch   Result Value Ref Range    Color, UA Yellow Yellow, Straw    Appearance, UA Cloudy (A) Clear    pH, UA >=9.0 (H) 5.0 - 8.0    Specific Gravity, UA 1.023 1.001 - 1.030    Glucose, UA Negative Negative    Ketones, UA 15 mg/dL (1+) (A) Negative    Bilirubin, UA Negative Negative    Blood, UA Negative Negative    Protein, UA 30 mg/dL (1+) (A) Negative    Leuk Esterase, UA Moderate (2+) (A) Negative    Nitrite, UA Negative Negative    Urobilinogen, UA 0.2 E.U./dL 0.2 - 1.0 E.U./dL   Urinalysis, Microscopic Only - Urine, Clean Catch    Collection Time: 11/26/24  5:10 PM    Specimen: Urine, Clean Catch   Result Value Ref Range    RBC, UA 6-10 (A) None Seen, 0-2 /HPF    WBC, UA 11-20 (A) None Seen, 0-2 /HPF    Bacteria, UA 4+ (A) None Seen, Trace /HPF    Squamous Epithelial Cells, UA 21-30  (A) None Seen, 0-2 /HPF    Hyaline Casts, UA None Seen 0 - 6 /LPF    Mucus, UA Trace None Seen, Trace /HPF    Methodology Manual Light Microscopy    POC Urine Pregnancy    Collection Time: 11/26/24  5:12 PM    Specimen: Urine   Result Value Ref Range    HCG, Urine, QL Negative Negative    Lot Number [id number]     Internal Positive Control Positive Positive, Passed    Internal Negative Control Negative Negative, Passed    Expiration Date 2025-01-29        If labs were ordered, I independently reviewed the results and considered them in treating the patient.    RADIOLOGY  No orders to display     [] Radiologist's Report Reviewed:  I ordered and independently interpreted the above noted radiographic studies.  See radiologist's dictation for official interpretation.      PROCEDURES    Procedures    No orders to display       MEDICATIONS GIVEN IN ER    Medications   sodium chloride 0.9 % flush 10 mL (has no administration in time range)   sodium chloride 0.9 % bolus 1,000 mL (0 mL Intravenous Stopped 11/26/24 1809)   ondansetron (ZOFRAN) injection 4 mg (4 mg Intravenous Given 11/26/24 1718)       MEDICAL DECISION MAKING, PROGRESS, and CONSULTS   Medical Decision Making  Oksana Aguirre is a 24 y.o. female who presents to the ED c/o nausea and vomiting.  Patient advises that she went out drinking on Sunday night.  Since she has had nausea, vomiting.  Patient complains of feeling generally weak. Pt c/o a discomfort in the epigastric region.  Patient reports minimal urine output.      Problems Addressed:  Nausea and vomiting, unspecified vomiting type: complicated acute illness or injury     Details: Patient was given a liter of IV fluids and Zofran.  Patient reports she is feeling much better at this time.    Amount and/or Complexity of Data Reviewed  Labs: ordered. Decision-making details documented in ED Course.    Risk  Prescription drug management.        Discussion below represents my analysis of pertinent findings  related to patient's condition, differential diagnosis, treatment plan and final disposition.    Differential diagnosis:  The differential diagnosis associated with the patient's presentation includes: Nausea, vomiting, gastritis, electrolyte imbalance, dehydration    Additional sources  Discussed/ obtained information from independent historians:   [] Spouse  [] Parent  [] Family member  [] Friend  [] EMS   [] Other:  External (non-ED) record review:   [] Inpatient record:   [] Office record:   [] Outpatient record:   [x] Prior Outpatient labs:   [x] Prior Outpatient radiology:   [] Primary Care record:   [] Outside ED record:   [] Other:   Patient's care impacted by:   [] Diabetes  [x] Hypertension  [] Hyperlipidemia  [] Hypothyroidism   [] Coronary Artery Disease   [] COPD   [] Cancer   [] Obesity  [] GERD   [] Tobacco Abuse   [] Substance Abuse    [] Anxiety   [] Depression   [] Other:   Care significantly affected by Social Determinants of Health (housing and economic circumstances, unemployment)    [] Yes     [x] No   If yes, Patient's care significantly limited by  Social Determinants of Health including:   [] Inadequate housing   [] Low income   [] Alcoholism and drug addiction in family   [] Problems related to primary support group   [] Unemployment   [] Problems related to employment   [] Other Social Determinants of Health:   Shared decision making: Shared decision making with patient.  Pt is feeling much better at this time.  We will discharge the patient home.  Patient to take Zofran as ordered.  Patient to follow-up with primary care as needed.  Patient to return to the ER as needed.    Orders placed during this visit:  Orders Placed This Encounter   Procedures    Comprehensive Metabolic Panel    Lipase    Urinalysis With Microscopic If Indicated (No Culture) - Urine, Clean Catch    CBC Auto Differential    Urinalysis, Microscopic Only - Urine, Clean Catch    POC Urine Pregnancy    Insert Peripheral  IV    CBC & Differential       I considered prescription management  with:   [] Pain medication  [] Antiviral  [] Antibiotic   [] Other:   Rationale:  Additional orders considered but not ordered:  The following testing was considered but ultimately not selected after discussion with patient/family:    ED Course:    ED Course as of 11/26/24 1812 Tue Nov 26, 2024 1745 HCG, Urine QL: Negative [KG]   1745 Lipase: 18 [KG]   1745 Glucose: 86 [KG]   1745 BUN: 10 [KG]   1745 Creatinine: 0.64 [KG]   1745 Sodium: 140 [KG]   1745 WBC: 8.48 [KG]   1745 Hemoglobin: 13.9 [KG]   1745 Hematocrit: 42.4 [KG]      ED Course User Index  [KG] Fern Mcgrath APRN            DIAGNOSIS  Final diagnoses:   Nausea and vomiting, unspecified vomiting type       DISPOSITION    DISCHARGE    Patient discharged in stable condition.    Reviewed implications of results, diagnosis, meds, responsibility to follow up, warning signs and symptoms of possible worsening, potential complications and reasons to return to ER.    Patient/Family voiced understanding of above instructions.    Discussed plan for discharge, as there is no emergent indication for admission.  Pt/family is agreeable and understands need for follow up and possible repeat testing.  Pt/family is aware that discharge does not mean that nothing is wrong but that it indicates no emergency is currently present that requires admission and they must continue care with follow-up as given below or with a physician of their choice.     FOLLOW-UP  Rizwana Odonnell, APRN  [address]  [address]  [facility] [id number] [phone]               Medication List        New Prescriptions      ondansetron ODT 4 MG disintegrating tablet  Commonly known as: ZOFRAN-ODT  Place 1 tablet on the tongue 4 (Four) Times a Day As Needed for Nausea or Vomiting.               Where to Get Your Medications        These medications were sent to [id number] - [place], [place] - [address] AT [facility] [address] - [phone]  - [phone] FX  [address], [facility] [address]      Phone: [phone]   ondansetron ODT 4 MG disintegrating tablet          ED Disposition       ED Disposition   Discharge    Condition   Stable    Comment   --               Please note that portions of this document were completed with voice recognition software.       Fern Mcgrath, APRN  11/26/24 1815

## 2025-01-04 ENCOUNTER — PATIENT ROUNDING (BHMG ONLY) (OUTPATIENT)
Dept: URGENT CARE | Facility: CLINIC | Age: 25
End: 2025-01-04
Payer: COMMERCIAL